# Patient Record
Sex: FEMALE | URBAN - METROPOLITAN AREA
[De-identification: names, ages, dates, MRNs, and addresses within clinical notes are randomized per-mention and may not be internally consistent; named-entity substitution may affect disease eponyms.]

---

## 2019-01-03 RX ORDER — IRBESARTAN AND HYDROCHLOROTHIAZIDE 150; 12.5 MG/1; MG/1
1 TABLET, FILM COATED ORAL DAILY
COMMUNITY

## 2019-01-03 RX ORDER — AMLODIPINE BESYLATE 5 MG/1
5 TABLET ORAL DAILY
COMMUNITY

## 2019-01-03 RX ORDER — ALBUTEROL SULFATE 90 UG/1
2 AEROSOL, METERED RESPIRATORY (INHALATION)
COMMUNITY
End: 2020-07-18 | Stop reason: HOSPADM

## 2019-01-03 RX ORDER — DIVALPROEX SODIUM 250 MG/1
250 TABLET, DELAYED RELEASE ORAL EVERY 12 HOURS SCHEDULED
COMMUNITY
End: 2020-07-18 | Stop reason: HOSPADM

## 2019-01-03 RX ORDER — WATER / MINERAL OIL / WHITE PETROLATUM 16 OZ
1 CREAM TOPICAL 2 TIMES DAILY
COMMUNITY

## 2019-01-03 RX ORDER — DOCUSATE SODIUM 100 MG/1
100 CAPSULE, LIQUID FILLED ORAL 2 TIMES DAILY
COMMUNITY

## 2019-01-03 RX ORDER — ATORVASTATIN CALCIUM 10 MG/1
10 TABLET, FILM COATED ORAL DAILY
COMMUNITY

## 2019-01-03 RX ORDER — LEVOTHYROXINE SODIUM 0.07 MG/1
75 TABLET ORAL DAILY
COMMUNITY

## 2019-01-03 RX ORDER — RISPERIDONE 2 MG/1
3 TABLET, FILM COATED ORAL DAILY
COMMUNITY

## 2019-01-03 NOTE — PRE-PROCEDURE INSTRUCTIONS
My Surgical Experience    The following information was developed to assist you to prepare for your operation  What do I need to do before coming to the hospital?   Arrange for a responsible person to drive you to and from the hospital    Arrange care for your children at home  Children are not allowed in the recovery areas of the hospital   Plan to wear clothing that is easy to put on and take off  If you are having shoulder surgery, wear a shirt that buttons or zippers in the front  Bathing  o Shower the evening before and the morning of your surgery with an antibacterial soap  Please refer to the Pre Op Showering Instructions for Surgery Patients Sheet   o Remove nail polish and all body piercing jewelry  o Do not shave any body part for at least 24 hours before surgery-this includes face, arms, legs and upper body  Food  o Nothing to eat or drink after midnight the night before your surgery  This includes candy and chewing gum  o Exception: If your surgery is after 12:00pm (noon), you may have clear liquids such as 7-Up®, ginger ale, apple or cranberry juice, Jell-O®, water, or clear broth until 8:00 am  o Do not drink milk or juice with pulp on the morning before surgery  o Do not drink alcohol 24 hours before surgery  Medicine  o Follow instructions you received from your surgeon about which medicines you may take on the day of surgery  o If instructed to take medicine on the morning of surgery, take pills with just a small sip of water  Call your prescribing doctor for specific infroamtion on what to do if you take insulin    What should I bring to the hospital?    Bring:  Daniel Ahr or a walker, if you have them, for foot or knee surgery   A list of the daily medicines, vitamins, minerals, herbals and nutritional supplements you take   Include the dosages of medicines and the time you take them each day   Glasses, dentures or hearing aids   Minimal clothing; you will be wearing hospital sleepwear   Photo ID; required to verify your identity   If you have a Living Will or Power of , bring a copy of the documents   If you have an ostomy, bring an extra pouch and any supplies you use    Do not bring   Medicines or inhalers   Money, valuables or jewelry    What other information should I know about the day of surgery?  Notify your surgeons if you develop a cold, sore throat, cough, fever, rash or any other illness   Report to the Ambulatory Surgical/Same Day Surgery Unit   You will be instructed to stop at Registration only if you have not been pre-registered   Inform your  fi they do not stay that they will be asked by the staff to leave a phone number where they can be reached   Be available to be reached before surgery  In the event the operating room schedule changes, you may be asked to come in earlier or later than expected    *It is important to tell your doctor and others involved in your health care if you are taking or have been taking any non-prescription drugs, vitamins, minerals, herbals or other nutritional supplements  Any of these may interact with some food or medicines and cause a reaction      Pre-Surgery Instructions:   Medication Instructions    albuterol (PROVENTIL HFA,VENTOLIN HFA) 90 mcg/act inhaler Instructed patient per Anesthesia Guidelines   amLODIPine (NORVASC) 5 mg tablet Instructed patient per Anesthesia Guidelines   atorvastatin (LIPITOR) 10 mg tablet Instructed patient per Anesthesia Guidelines   divalproex sodium (DEPAKOTE) 250 mg EC tablet Instructed patient per Anesthesia Guidelines   docusate sodium (COLACE) 100 mg capsule Instructed patient per Anesthesia Guidelines   fluticasone-salmeterol (ADVAIR) 500-50 mcg/dose inhaler Instructed patient per Anesthesia Guidelines   irbesartan-hydrochlorothiazide (AVALIDE) 150-12 5 MG per tablet Instructed patient per Anesthesia Guidelines      levothyroxine 75 mcg tablet Instructed patient per Anesthesia Guidelines   risperiDONE (RisperDAL) 2 mg tablet Instructed patient per Anesthesia Guidelines   white petrolatum-mineral oil (EUCERIN) cream Instructed patient per Anesthesia Guidelines  To take amlodipine, depakote, lipitor, avalide, synthroid, risperdal, advair inhaler and maimtain O2 a m  Of surgery

## 2019-01-14 ENCOUNTER — ANESTHESIA EVENT (OUTPATIENT)
Dept: PERIOP | Facility: HOSPITAL | Age: 59
End: 2019-01-14
Payer: COMMERCIAL

## 2019-01-14 NOTE — ANESTHESIA PREPROCEDURE EVALUATION
Review of Systems/Medical History  Patient summary reviewed  Chart reviewed  No history of anesthetic complications     Cardiovascular  Hyperlipidemia, Hypertension ,    Pulmonary  COPD (uses O2 at 1L every 24 hrs) , Oxygen dependent ,        GI/Hepatic            Endo/Other  History of thyroid disease , hypothyroidism,      GYN       Hematology   Musculoskeletal       Neurology   Psychology       Comment: Intellectual disability verbal Bradycardia   Schizo affective schizophrenia                 Physical Exam    Airway    Mallampati score: II  TM Distance: >3 FB  Neck ROM: full     Dental       Cardiovascular  Cardiovascular exam normal    Pulmonary  Pulmonary exam normal     Other Findings        Anesthesia Plan  ASA Score- 3     Anesthesia Type- general with ASA Monitors  Additional Monitors:   Airway Plan: NTT  Plan Factors-    Induction- intravenous  Postoperative Plan- Plan for postoperative opioid use  Informed Consent- Anesthetic plan and risks discussed with legal guardian  I personally reviewed this patient with the CRNA  Discussed and agreed on the Anesthesia Plan with the CRNA  [facility]

## 2019-01-15 ENCOUNTER — ANESTHESIA (OUTPATIENT)
Dept: PERIOP | Facility: HOSPITAL | Age: 59
End: 2019-01-15
Payer: COMMERCIAL

## 2019-01-15 ENCOUNTER — HOSPITAL ENCOUNTER (OUTPATIENT)
Facility: HOSPITAL | Age: 59
Setting detail: OUTPATIENT SURGERY
Discharge: OTHER INSTITUTION | End: 2019-01-15
Attending: DENTIST | Admitting: DENTIST
Payer: COMMERCIAL

## 2019-01-15 ENCOUNTER — HOSPITAL ENCOUNTER (OUTPATIENT)
Dept: RADIOLOGY | Facility: HOSPITAL | Age: 59
Setting detail: OUTPATIENT SURGERY
Discharge: HOME | End: 2019-01-15
Payer: COMMERCIAL

## 2019-01-15 VITALS
WEIGHT: 140 LBS | HEART RATE: 74 BPM | DIASTOLIC BLOOD PRESSURE: 77 MMHG | RESPIRATION RATE: 18 BRPM | TEMPERATURE: 97.2 F | SYSTOLIC BLOOD PRESSURE: 127 MMHG | HEIGHT: 60 IN | OXYGEN SATURATION: 100 % | BODY MASS INDEX: 27.48 KG/M2

## 2019-01-15 RX ORDER — BUPIVACAINE HYDROCHLORIDE AND EPINEPHRINE 5; 5 MG/ML; UG/ML
INJECTION, SOLUTION PERINEURAL AS NEEDED
Status: DISCONTINUED | OUTPATIENT
Start: 2019-01-15 | End: 2019-01-15 | Stop reason: HOSPADM

## 2019-01-15 RX ORDER — DEXAMETHASONE SODIUM PHOSPHATE 4 MG/ML
INJECTION, SOLUTION INTRA-ARTICULAR; INTRALESIONAL; INTRAMUSCULAR; INTRAVENOUS; SOFT TISSUE AS NEEDED
Status: DISCONTINUED | OUTPATIENT
Start: 2019-01-15 | End: 2019-01-15 | Stop reason: SURG

## 2019-01-15 RX ORDER — SODIUM CHLORIDE 9 MG/ML
125 INJECTION, SOLUTION INTRAVENOUS CONTINUOUS
Status: DISCONTINUED | OUTPATIENT
Start: 2019-01-15 | End: 2019-01-15 | Stop reason: HOSPADM

## 2019-01-15 RX ORDER — CLINDAMYCIN PHOSPHATE 600 MG/50ML
600 INJECTION INTRAVENOUS ONCE
Status: COMPLETED | OUTPATIENT
Start: 2019-01-15 | End: 2019-01-15

## 2019-01-15 RX ORDER — ONDANSETRON 2 MG/ML
INJECTION INTRAMUSCULAR; INTRAVENOUS AS NEEDED
Status: DISCONTINUED | OUTPATIENT
Start: 2019-01-15 | End: 2019-01-15 | Stop reason: SURG

## 2019-01-15 RX ORDER — CHLORHEXIDINE GLUCONATE 0.12 MG/ML
RINSE ORAL AS NEEDED
Status: DISCONTINUED | OUTPATIENT
Start: 2019-01-15 | End: 2019-01-15 | Stop reason: HOSPADM

## 2019-01-15 RX ORDER — PROPOFOL 10 MG/ML
INJECTION, EMULSION INTRAVENOUS AS NEEDED
Status: DISCONTINUED | OUTPATIENT
Start: 2019-01-15 | End: 2019-01-15 | Stop reason: SURG

## 2019-01-15 RX ORDER — MAGNESIUM HYDROXIDE 1200 MG/15ML
LIQUID ORAL AS NEEDED
Status: DISCONTINUED | OUTPATIENT
Start: 2019-01-15 | End: 2019-01-15 | Stop reason: HOSPADM

## 2019-01-15 RX ORDER — ROCURONIUM BROMIDE 10 MG/ML
INJECTION, SOLUTION INTRAVENOUS AS NEEDED
Status: DISCONTINUED | OUTPATIENT
Start: 2019-01-15 | End: 2019-01-15 | Stop reason: SURG

## 2019-01-15 RX ADMIN — PROPOFOL 200 MG: 10 INJECTION, EMULSION INTRAVENOUS at 07:43

## 2019-01-15 RX ADMIN — CLINDAMYCIN PHOSPHATE 600 MG: 12 INJECTION, SOLUTION INTRAMUSCULAR; INTRAVENOUS at 07:25

## 2019-01-15 RX ADMIN — PHENYLEPHRINE HYDROCHLORIDE 3 DROP: 1 SPRAY NASAL at 07:43

## 2019-01-15 RX ADMIN — SODIUM CHLORIDE: 0.9 INJECTION, SOLUTION INTRAVENOUS at 10:08

## 2019-01-15 RX ADMIN — ONDANSETRON 4 MG: 2 INJECTION INTRAMUSCULAR; INTRAVENOUS at 07:43

## 2019-01-15 RX ADMIN — ROCURONIUM BROMIDE 35 MG: 10 INJECTION INTRAVENOUS at 07:43

## 2019-01-15 RX ADMIN — SODIUM CHLORIDE 125 ML/HR: 0.9 INJECTION, SOLUTION INTRAVENOUS at 06:42

## 2019-01-15 RX ADMIN — DEXAMETHASONE SODIUM PHOSPHATE 4 MG: 4 INJECTION, SOLUTION INTRA-ARTICULAR; INTRALESIONAL; INTRAMUSCULAR; INTRAVENOUS; SOFT TISSUE at 07:43

## 2019-01-15 NOTE — ANESTHESIA POSTPROCEDURE EVALUATION
Post-Op Assessment Note      CV Status:  Stable    Mental Status:  Alert    Hydration Status:  Stable    PONV Controlled:  Controlled    Airway Patency:  Patent    Post Op Vitals Reviewed: Yes          Staff: CRNA           BP      Temp     Pulse     Resp      SpO2

## 2019-01-15 NOTE — OP NOTE
COMPLETE ORAL REHABILITATION: FULL MOUTH DENTAL X-RAYS, PLANING AND SCALING, COMPOSITE RESTORATIONS: #7-MFL, #5-O, EXTRACTION: #4, AMALGAM RESTORATION: #31-B,  Procedure Note    Britney Flight  1/15/2019    Pre-op Diagnosis:   Chronic periodontitis [K05 30]  Intellectual disability [F79]       Post-Op Diagnosis Codes:     * Chronic periodontitis [K05 30]     * Intellectual disability [F79]     * Situational anxiety [F41 8]     * Dental caries extending into dentin [K02 62]     * Retained dental root [K08 3]    Procedure(s):  COMPLETE ORAL REHABILITATION: FULL MOUTH DENTAL X-RAYS, PLANING AND SCALING, COMPOSITE RESTORATIONS: #7-MFL, #5-O, EXTRACTION: #4, AMALGAM RESTORATION: #31-B,    Surgeon(s) and Role:     * Moragn Morales DMD - Primary     Anesthesia: General    Staff:   Circulator: Damon Ba RN  Scrub Person: Ulysses Fritz  No qualified Resident was available, Resident is only observing    Estimated Blood Loss: Minimal    Specimens:                * No orders in the log *    Drains: None     Procedure:  After the induction of IV inhalation anesthesia with nasal tracheal intubation the patient was prepped and draped for intraoral dental procedure  A time-out identifying the patient and procedure was completed  Thirteen digital dental periapical radiographs were taken and processed  A posterior pharyngeal pack was inserted of cotton gauze with tails  Visual examination of the patient's oral cavity and dentition was accomplished related to the radiographic findings  Plaque and calculus deposits present  Gingival tissues were erythematous and edematous  Dental caries detected teeth numbers 4, 5, 7, 8, 10, 17, and 31  Tooth 4  nonrestorable due to extensive caries of the clinical crown  After the infiltration 0 9 mL of Marcaine 0 5% with 1 to [id number] epinephrine the following dental procedures and restorations were completed    Debridement of the dentition and gingival tissues utilizing ultrasonic scaling and hand instrumentation  A dental prophylaxis was completed  After the removal of dental caries and preparation of tooth number 31 the following amalgam was placed; B tooth number 31  After removal of dental caries and preparation of teeth teeth numbers 5, 7, 8, 10, the following resins were placed; O tooth 5, MFL tooth 7, MDFL tooth 8, MFL tooth 10  After the removal of dental caries and preparation of tooth 17 a GI resin was placed in the body of the tooth  No pulp exposure occurred due to the fact that the pulp canals were calcified  Due to the bulbous nature of the remaining root and the closeness of the mandibular canal, extraction of this tooth should be accomplished by an oral surgeon at a future date  Application of fluoride varnish to all teeth  The oral cavity was then irrigated and suctioned  The oral pharyngeal pack was removed and the oral pharyngeal area was suctioned  Patient left the operating room in stable condition was transported to postanesthesia care unit      Complications:  None      Morenita Lee DMD     Date: 1/15/2019  Time: 10:34 AM

## 2019-01-15 NOTE — INTERVAL H&P NOTE
H&P reviewed  After examining the patient I find no changes in the patients condition since the H&P had been written  Patient finished 7 day course of prednisone yesterday 1/14 which she was prescribed for cough  Attendant and patient both state patient can easily walk several blocks and up flights of stairs without becoming SOB  Lungs CTA b/l this morning   Heart rate and rhythm normal    Karyna Richardson PA-C

## 2019-01-15 NOTE — DISCHARGE SUMMARY
Discharge Summary - Becky Lepe 66 y o  female MRN: [id number]    Unit/Bed#: OR [place] Encounter: [id number]    Admission Date: 1/15/2019     Admitting Diagnosis: Chronic periodontitis [K05 30]  Intellectual disability [F79]      Procedures Performed: No orders of the defined types were placed in this encounter  Complications: none    Discharge Diagnosis: Post-Op Diagnosis Codes:     * Chronic periodontitis [K05 30]     * Intellectual disability [F79]     * Situational anxiety [F41 8]     * Dental caries extending into dentin [K02 62]     * Retained dental root [K08 3]    Condition at Discharge: good     Discharge instructions/Information to patient and family:   See after visit summary for information provided to patient and family  Provisions for Follow-Up Care:  See after visit summary for information related to follow-up care and any pertinent home health orders  Disposition: See After Visit Summary for discharge disposition information  Discharge Statement   I spent on the day of discharge  I had direct contact with the patient on the day of discharge  Additional documentation is required if more than 30 minutes were spent on discharge  Discharge Medications:  See after visit summary for reconciled discharge medications provided to patient and family

## 2019-01-15 NOTE — DISCHARGE INSTRUCTIONS
DISCHARGE INSTRUCTIONS  DENTAL PROCEDURE      1  Saline rinses 4x per day for 10 days    2  Do not use drinking straws if you have had dental extractions    3  Soft diet for 24 hours    4  May return to previous work and activity in  24-48 hours    5  Return to office for follow-up on 7-10 days      [facility]      1  Saline rinses 4x per day for 10 days    2  Do not use drinking straws if you have had dental extractions    3  Soft diet for 24 hours    4  May return to previous work and activity in  24-48 hours    5    Return to office for follow-up on 7-10 days

## 2019-01-15 NOTE — OP NOTE
OPERATIVE REPORT  PATIENT NAME: Andrew Tenorio    :  1960  MRN: [id number]  Pt Location: [facility] OR ROOM 02    SURGERY DATE: 1/15/2019    Surgeon(s) and Role:     * Rubi Bruner DMD - Primary    Preop Diagnosis:  Chronic periodontitis [K05 30]  Intellectual disability [F79]    Post-Op Diagnosis Codes:     * Chronic periodontitis [K05 30]     * Intellectual disability [F79]     * Situational anxiety [F41 8]     * Dental caries extending into dentin [K02 62]     * Retained dental root [K08 3]    Procedure(s) (LRB):  COMPLETE ORAL REHABILITATION: FULL MOUTH DENTAL X-RAYS, PLANING AND SCALING, COMPOSITE RESTORATIONS: #7-MFL, #5-O, EXTRACTION: #4, AMALGAM RESTORATION: #31-B, (N/A)    Specimen(s):  * No specimens in log *    Estimated Blood Loss:   Minimal    Drains:       Anesthesia Type:   General    Operative Indications:  Chronic periodontitis [K05 30]  Intellectual disability [F79]    Drains: None    Complications: None    Procedure After the induction of IV inhalation anesthesia with nasal tracheal intubation the patient was prepped and draped for intraoral dental procedure  A time-out identifying the patient and procedure was completed  Thirteen digital dental periapical radiographs were taken and processed  A posterior pharyngeal pack was inserted of cotton gauze with tails  Visual examination of the patient's oral cavity and dentition was accomplished related to the radiographic findings  Plaque and calculus deposits present  Gingival tissues were erythematous and edematous  Dental caries detected teeth numbers 4, 5, 7, 8, 10, 17, and 31  Tooth 4  nonrestorable due to extensive caries of the clinical crown  After the infiltration 0 9 mL of Marcaine 0 5% with 1 to [id number] epinephrine the following dental procedures and restorations were completed  Debridement of the dentition and gingival tissues utilizing ultrasonic scaling and hand instrumentation  A dental prophylaxis was completed    After the removal of dental caries and preparation of tooth number 31 the following amalgam was placed; B tooth number 31  After removal of dental caries and preparation of teeth teeth numbers 5, 7, 8, 10, the following resins were placed; O tooth 5, MFL tooth 7, MDFL tooth 8, MFL tooth 10  After the removal of dental caries and preparation of tooth 17 a GI resin was placed in the body of the tooth  No pulp exposure occurred due to the fact that the pulp canals were calcified  Due to the bulbous nature of the remaining root and the closeness of the mandibular canal, extraction of this tooth should be accomplished by an oral surgeon at a future date  A surgical extraction of tooth 4 was accomplished by using a 15 blade to establish a full-thickness flap buccal to tooth 4  Surgical handpiece with a surgical length 56 bur was employed to remove buccal and mesial alveolar bone  The root was then elevated and removed with forceps  Socket site was curetted, irrigated and suctioned  Gel-Foam was placed at the extraction site  The gingival tissue was repositioned and 3-0 Vicryl sutures were placed at the extraction site  Application of fluoride varnish to all teeth  The oral cavity was then irrigated and suctioned  The oral pharyngeal pack was removed and the oral pharyngeal area was suctioned  Patient left the operating room in stable condition was transported to postanesthesia care unit        SIGNATURE: Lilliana Mariee DMD  DATE: January 15, 2019  TIME: 12:28 PM

## 2019-01-15 NOTE — PERIOPERATIVE NURSING NOTE
Patient tolerating apple juice, no oral drainage seen  Attendant at bedside, discharge instructions given to attendant with forms for group home

## 2020-07-07 ENCOUNTER — OFFICE VISIT (OUTPATIENT)
Dept: URGENT CARE | Facility: CLINIC | Age: 60
End: 2020-07-07
Payer: MEDICARE

## 2020-07-07 DIAGNOSIS — Z11.59 SCREENING FOR VIRAL DISEASE: Primary | ICD-10-CM

## 2020-07-07 PROCEDURE — 99202 OFFICE O/P NEW SF 15 MIN: CPT | Performed by: PHYSICIAN ASSISTANT

## 2020-07-07 PROCEDURE — U0003 INFECTIOUS AGENT DETECTION BY NUCLEIC ACID (DNA OR RNA); SEVERE ACUTE RESPIRATORY SYNDROME CORONAVIRUS 2 (SARS-COV-2) (CORONAVIRUS DISEASE [COVID-19]), AMPLIFIED PROBE TECHNIQUE, MAKING USE OF HIGH THROUGHPUT TECHNOLOGIES AS DESCRIBED BY CMS-2020-01-R: HCPCS | Performed by: PHYSICIAN ASSISTANT

## 2020-07-07 NOTE — PROGRESS NOTES
[facility]        NAME: Estefani Duffy is a 64 y o  female  : 1960    MRN: [id number]  DATE:  2020  TIME: 5:53 PM    Assessment and Plan   Screening for viral disease [Z11 59]  1  Screening for viral disease  Novel Coronavirus (COVID-19), PCR [facility]    INPATIENT (COVID-19 HIGH PRIORITY)    Novel Coronavirus (COVID-19), PCR [facility]    INPATIENT (COVID-19 HIGH PRIORITY)    CANCELED: PAT Covid Screening         Patient Instructions     Patient will be swabbed for COVID-19 today in advance for planned procedure  She will be contacted with results once obtained  Chief Complaint     Chief Complaint   Patient presents with    COVID-19     Pre-op Eval         History of Present Illness       Patient presents requesting [id number] testing in advance of surgical procedure for PAT screening  She is asymptomatic and has no complaints at today's visit  Review of Systems   Review of Systems   Constitutional: Negative  Respiratory: Negative  Cardiovascular: Negative  Gastrointestinal: Negative  Genitourinary: Negative            Current Medications       Current Outpatient Medications:     albuterol (2 5 mg/3 mL) 0 083 % nebulizer solution, 3 times a day and every 4 hours p r n , Disp: , Rfl: 0    amLODIPine (NORVASC) 5 mg tablet, Take 5 mg by mouth daily, Disp: , Rfl:     atorvastatin (LIPITOR) 10 mg tablet, Take 10 mg by mouth daily, Disp: , Rfl:     budesonide (PULMICORT) 0 5 mg/2 mL nebulizer solution, Take 0 5 mg by nebulization daily Rinse mouth after use  , Disp: , Rfl:     dextromethorphan 15 MG/5ML syrup, Take 10 mL by mouth 4 (four) times a day as needed for cough, Disp: , Rfl:     divalproex sodium (DEPAKOTE) 250 mg EC tablet, Take 1 tablet (250 mg total) by mouth daily, Disp: , Rfl: 0    divalproex sodium (DEPAKOTE) 250 mg EC tablet, Take 2 tablets (500 mg total) by mouth daily at bedtime, Disp: , Rfl: 0    docusate sodium (COLACE) 100 mg capsule, Take 100 mg by mouth 2 (two) times a day, Disp: , Rfl:     fluticasone-salmeterol (ADVAIR) 500-50 mcg/dose inhaler, Inhale 1 puff 2 (two) times a day Rinse mouth after use , Disp: , Rfl:     hydrocodone-chlorpheniramine polistirex (TUSSIONEX) 10-8 mg/5 mL ER suspension, Take 5 mL by mouth every 12 (twelve) hours as needed for cough for up to 7 daysMax Daily Amount: 10 mL, Disp: 70 mL, Rfl: 0    ibuprofen (MOTRIN) 200 mg tablet, Take by mouth every 6 (six) hours as needed for mild pain, Disp: , Rfl:     irbesartan-hydrochlorothiazide (AVALIDE) 150-12 5 MG per tablet, Take 1 tablet by mouth daily, Disp: , Rfl:     levothyroxine 75 mcg tablet, Take 75 mcg by mouth daily, Disp: , Rfl:     magnesium hydroxide (MILK OF MAGNESIA) 400 mg/5 mL oral suspension, Take by mouth daily as needed for constipation, Disp: , Rfl:     pseudoephedrine (SUDAFED) 30 mg tablet, Take 60 mg by mouth every 4 (four) hours as needed for congestion, Disp: , Rfl:     risperiDONE (RisperDAL) 2 mg tablet, Take 3 mg by mouth daily , Disp: , Rfl:     risperiDONE (RisperDAL) 2 mg tablet, Take 2 mg by mouth daily at bedtime, Disp: , Rfl:     umeclidinium bromide (Incruse Ellipta) 62 5 mcg/inh AEPB inhaler, Inhale 1 puff daily, Disp: , Rfl:     white petrolatum-mineral oil (EUCERIN) cream, Apply 1 application topically 2 (two) times a day To both hands  , Disp: , Rfl:     Current Allergies     Allergies as of 07/07/2020    (No Known Allergies)            The following portions of the patient's history were reviewed and updated as appropriate: allergies, current medications, past family history, past medical history, past social history, past surgical history and problem list      Past Medical History:   Diagnosis Date    Bradycardia     COPD (chronic obstructive pulmonary disease) ([facility] [address] )     uses O2 at 1L every 24 hrs      Disease of thyroid gland     hypo    Hyperlipidemia     Hypertension     Intellectual disability     verbal    Schizo affective schizophrenia [facility])        Past Surgical History:   Procedure Laterality Date    INSERT / REPLACE / REMOVE PACEMAKER  2009    [place] DENTAL SURGERY PROCEDURE N/A 1/15/2019    Procedure: COMPLETE ORAL REHABILITATION: FULL MOUTH DENTAL X-RAYS, PLANING AND SCALING, COMPOSITE RESTORATIONS: #7-MFL, #5-O, EXTRACTION: #4, AMALGAM RESTORATION: #31-B,;  Surgeon: Luan Cabrera DMD;  Location: [address] [place] [address];  Service: Oral Surgery       History reviewed  No pertinent family history  Medications have been verified  Objective   Pulse 64   Temp 97 9 °F (36 6 °C)   SpO2 (!) 88%        Physical Exam     Physical Exam   Constitutional: She is oriented to person, place, and time  She appears well-developed and well-nourished  No distress  Cardiovascular: Normal rate, regular rhythm and normal heart sounds  No murmur heard  Pulmonary/Chest: Effort normal and breath sounds normal  No respiratory distress  Neurological: She is alert and oriented to person, place, and time  Psychiatric: She has a normal mood and affect  Vitals reviewed

## 2020-07-09 ENCOUNTER — TELEPHONE (OUTPATIENT)
Dept: URGENT CARE | Facility: CLINIC | Age: 60
End: 2020-07-09

## 2020-07-09 LAB
INPATIENT: NORMAL
SARS-COV-2 RNA SPEC QL NAA+PROBE: NOT DETECTED

## 2020-07-09 NOTE — TELEPHONE ENCOUNTER
Called for COVID results  Negative  Haylee Ledbetter our Tech will fax results to the dentist office, release is on file

## 2020-07-10 ENCOUNTER — APPOINTMENT (EMERGENCY)
Dept: RADIOLOGY | Facility: HOSPITAL | Age: 60
DRG: 207 | End: 2020-07-10
Payer: MEDICARE

## 2020-07-10 ENCOUNTER — HOSPITAL ENCOUNTER (INPATIENT)
Facility: HOSPITAL | Age: 60
LOS: 8 days | Discharge: NURSING, REHAB OR LONG-TERM CARE | DRG: 207 | End: 2020-07-18
Attending: EMERGENCY MEDICINE | Admitting: INTERNAL MEDICINE
Payer: MEDICARE

## 2020-07-10 ENCOUNTER — APPOINTMENT (INPATIENT)
Dept: RADIOLOGY | Facility: HOSPITAL | Age: 60
DRG: 207 | End: 2020-07-10
Payer: MEDICARE

## 2020-07-10 DIAGNOSIS — J69.0 ASPIRATION PNEUMONIA (HCC): ICD-10-CM

## 2020-07-10 DIAGNOSIS — F25.9 SCHIZO AFFECTIVE SCHIZOPHRENIA (HCC): Chronic | ICD-10-CM

## 2020-07-10 DIAGNOSIS — J96.00 ACUTE RESPIRATORY FAILURE REQUIRING REINTUBATION (HCC): Primary | ICD-10-CM

## 2020-07-10 PROBLEM — E78.5 HYPERLIPIDEMIA: Status: ACTIVE | Noted: 2020-07-10

## 2020-07-10 PROBLEM — F79 INTELLECTUAL DISABILITY: Status: ACTIVE | Noted: 2020-07-10

## 2020-07-10 PROBLEM — I10 HYPERTENSION: Status: ACTIVE | Noted: 2020-07-10

## 2020-07-10 PROBLEM — J44.9 COPD (CHRONIC OBSTRUCTIVE PULMONARY DISEASE) (HCC): Status: ACTIVE | Noted: 2020-07-10

## 2020-07-10 PROBLEM — E07.9 DISEASE OF THYROID GLAND: Status: ACTIVE | Noted: 2020-07-10

## 2020-07-10 PROBLEM — J96.02 ACUTE RESPIRATORY FAILURE WITH HYPERCAPNIA (HCC): Status: ACTIVE | Noted: 2020-07-10

## 2020-07-10 PROBLEM — J45.901 ASTHMA EXACERBATION: Status: ACTIVE | Noted: 2019-03-04

## 2020-07-10 LAB
ALBUMIN SERPL BCP-MCNC: 2.6 G/DL (ref 3.5–5)
ALP SERPL-CCNC: 47 U/L (ref 46–116)
ALT SERPL W P-5'-P-CCNC: 17 U/L (ref 12–78)
ANION GAP SERPL CALCULATED.3IONS-SCNC: 6 MMOL/L (ref 4–13)
ARTERIAL PATENCY WRIST A: YES
AST SERPL W P-5'-P-CCNC: 25 U/L (ref 5–45)
BASE EXCESS BLDA CALC-SCNC: 4.6 MMOL/L
BASE EXCESS BLDA CALC-SCNC: 5.1 MMOL/L
BASOPHILS # BLD AUTO: 0.01 THOUSANDS/ΜL (ref 0–0.1)
BASOPHILS NFR BLD AUTO: 0 % (ref 0–1)
BILIRUB SERPL-MCNC: 0.3 MG/DL (ref 0.2–1)
BODY TEMPERATURE: 96.2 DEGREES FEHRENHEIT
BODY TEMPERATURE: 96.4 DEGREES FEHRENHEIT
BUN SERPL-MCNC: 14 MG/DL (ref 5–25)
CALCIUM SERPL-MCNC: 8.4 MG/DL (ref 8.3–10.1)
CHLORIDE SERPL-SCNC: 97 MMOL/L (ref 100–108)
CO2 SERPL-SCNC: 30 MMOL/L (ref 21–32)
CREAT SERPL-MCNC: 0.96 MG/DL (ref 0.6–1.3)
EOSINOPHIL # BLD AUTO: 0.06 THOUSAND/ΜL (ref 0–0.61)
EOSINOPHIL NFR BLD AUTO: 1 % (ref 0–6)
ERYTHROCYTE [DISTWIDTH] IN BLOOD BY AUTOMATED COUNT: 12 % (ref 11.6–15.1)
GFR SERPL CREATININE-BSD FRML MDRD: 65 ML/MIN/1.73SQ M
GLUCOSE SERPL-MCNC: 122 MG/DL (ref 65–140)
HCO3 BLDA-SCNC: 31.4 MMOL/L (ref 22–28)
HCO3 BLDA-SCNC: 31.7 MMOL/L (ref 22–28)
HCT VFR BLD AUTO: 30.6 % (ref 34.8–46.1)
HGB BLD-MCNC: 9.9 G/DL (ref 11.5–15.4)
HOROWITZ INDEX BLDA+IHG-RTO: 50 MM[HG]
HOROWITZ INDEX BLDA+IHG-RTO: 80 MM[HG]
IMM GRANULOCYTES # BLD AUTO: 0.01 THOUSAND/UL (ref 0–0.2)
IMM GRANULOCYTES NFR BLD AUTO: 0 % (ref 0–2)
LYMPHOCYTES # BLD AUTO: 0.48 THOUSANDS/ΜL (ref 0.6–4.47)
LYMPHOCYTES NFR BLD AUTO: 11 % (ref 14–44)
MAGNESIUM SERPL-MCNC: 1.6 MG/DL (ref 1.6–2.6)
MCH RBC QN AUTO: 31.3 PG (ref 26.8–34.3)
MCHC RBC AUTO-ENTMCNC: 32.4 G/DL (ref 31.4–37.4)
MCV RBC AUTO: 97 FL (ref 82–98)
MONOCYTES # BLD AUTO: 0.1 THOUSAND/ΜL (ref 0.17–1.22)
MONOCYTES NFR BLD AUTO: 2 % (ref 4–12)
NEUTROPHILS # BLD AUTO: 3.89 THOUSANDS/ΜL (ref 1.85–7.62)
NEUTS SEG NFR BLD AUTO: 86 % (ref 43–75)
NRBC BLD AUTO-RTO: 0 /100 WBCS
O2 CT BLDA-SCNC: 16 ML/DL (ref 16–23)
O2 CT BLDA-SCNC: 16.7 ML/DL (ref 16–23)
OXYHGB MFR BLDA: 99 % (ref 94–97)
OXYHGB MFR BLDA: 99.3 % (ref 94–97)
PCO2 BLDA: 54 MM HG (ref 36–44)
PCO2 BLDA: 60.4 MM HG (ref 36–44)
PCO2 TEMP ADJ BLDA: 51.2 MM HG (ref 36–44)
PCO2 TEMP ADJ BLDA: 57.1 MM HG (ref 36–44)
PEEP RESPIRATORY: 5 CM[H2O]
PEEP RESPIRATORY: 5 CM[H2O]
PH BLD: 7.36 [PH] (ref 7.35–7.45)
PH BLD: 7.4 [PH] (ref 7.35–7.45)
PH BLDA: 7.34 [PH] (ref 7.35–7.45)
PH BLDA: 7.38 [PH] (ref 7.35–7.45)
PLATELET # BLD AUTO: 170 THOUSANDS/UL (ref 149–390)
PMV BLD AUTO: 10.8 FL (ref 8.9–12.7)
PO2 BLD: 276.7 MM HG (ref 75–129)
PO2 BLD: 286.2 MM HG (ref 75–129)
PO2 BLDA: 282.4 MM HG (ref 75–129)
PO2 BLDA: 292.4 MM HG (ref 75–129)
POTASSIUM SERPL-SCNC: 4.3 MMOL/L (ref 3.5–5.3)
PROT SERPL-MCNC: 6.8 G/DL (ref 6.4–8.2)
RBC # BLD AUTO: 3.16 MILLION/UL (ref 3.81–5.12)
SODIUM SERPL-SCNC: 133 MMOL/L (ref 136–145)
SPECIMEN SOURCE: ABNORMAL
SPECIMEN SOURCE: ABNORMAL
VENT AC: 12
VENT AC: 16
VENT- AC: AC
VENT- AC: AC
VT SETTING VENT: 325 ML
VT SETTING VENT: 350 ML
WBC # BLD AUTO: 4.55 THOUSAND/UL (ref 4.31–10.16)

## 2020-07-10 PROCEDURE — 5A1955Z RESPIRATORY VENTILATION, GREATER THAN 96 CONSECUTIVE HOURS: ICD-10-PCS | Performed by: EMERGENCY MEDICINE

## 2020-07-10 PROCEDURE — 85025 COMPLETE CBC W/AUTO DIFF WBC: CPT | Performed by: EMERGENCY MEDICINE

## 2020-07-10 PROCEDURE — 99285 EMERGENCY DEPT VISIT HI MDM: CPT

## 2020-07-10 PROCEDURE — 94760 N-INVAS EAR/PLS OXIMETRY 1: CPT

## 2020-07-10 PROCEDURE — 99283 EMERGENCY DEPT VISIT LOW MDM: CPT | Performed by: EMERGENCY MEDICINE

## 2020-07-10 PROCEDURE — 94640 AIRWAY INHALATION TREATMENT: CPT

## 2020-07-10 PROCEDURE — 99223 1ST HOSP IP/OBS HIGH 75: CPT | Performed by: INTERNAL MEDICINE

## 2020-07-10 PROCEDURE — 36415 COLL VENOUS BLD VENIPUNCTURE: CPT | Performed by: EMERGENCY MEDICINE

## 2020-07-10 PROCEDURE — 82805 BLOOD GASES W/O2 SATURATION: CPT | Performed by: NURSE PRACTITIONER

## 2020-07-10 PROCEDURE — 87205 SMEAR GRAM STAIN: CPT | Performed by: STUDENT IN AN ORGANIZED HEALTH CARE EDUCATION/TRAINING PROGRAM

## 2020-07-10 PROCEDURE — 82805 BLOOD GASES W/O2 SATURATION: CPT | Performed by: EMERGENCY MEDICINE

## 2020-07-10 PROCEDURE — 94002 VENT MGMT INPAT INIT DAY: CPT

## 2020-07-10 PROCEDURE — 80053 COMPREHEN METABOLIC PANEL: CPT | Performed by: EMERGENCY MEDICINE

## 2020-07-10 PROCEDURE — 36600 WITHDRAWAL OF ARTERIAL BLOOD: CPT

## 2020-07-10 PROCEDURE — 87081 CULTURE SCREEN ONLY: CPT | Performed by: INTERNAL MEDICINE

## 2020-07-10 PROCEDURE — 71045 X-RAY EXAM CHEST 1 VIEW: CPT

## 2020-07-10 PROCEDURE — 94150 VITAL CAPACITY TEST: CPT

## 2020-07-10 PROCEDURE — 71250 CT THORAX DX C-: CPT

## 2020-07-10 PROCEDURE — 87070 CULTURE OTHR SPECIMN AEROBIC: CPT | Performed by: STUDENT IN AN ORGANIZED HEALTH CARE EDUCATION/TRAINING PROGRAM

## 2020-07-10 PROCEDURE — 83735 ASSAY OF MAGNESIUM: CPT | Performed by: EMERGENCY MEDICINE

## 2020-07-10 PROCEDURE — NC001 PR NO CHARGE: Performed by: NURSE PRACTITIONER

## 2020-07-10 RX ORDER — BUDESONIDE 0.5 MG/2ML
0.5 INHALANT ORAL DAILY
COMMUNITY

## 2020-07-10 RX ORDER — DEXMEDETOMIDINE HYDROCHLORIDE 4 UG/ML
.1-.7 INJECTION, SOLUTION INTRAVENOUS
Status: DISCONTINUED | OUTPATIENT
Start: 2020-07-10 | End: 2020-07-11

## 2020-07-10 RX ORDER — ACETAMINOPHEN 500 MG
500 TABLET ORAL EVERY 6 HOURS PRN
COMMUNITY
End: 2020-07-18 | Stop reason: HOSPADM

## 2020-07-10 RX ORDER — PROPOFOL 10 MG/ML
50 INJECTION, EMULSION INTRAVENOUS ONCE
Status: COMPLETED | OUTPATIENT
Start: 2020-07-10 | End: 2020-07-10

## 2020-07-10 RX ORDER — FENTANYL CITRATE 50 UG/ML
25 INJECTION, SOLUTION INTRAMUSCULAR; INTRAVENOUS
Status: DISCONTINUED | OUTPATIENT
Start: 2020-07-10 | End: 2020-07-11

## 2020-07-10 RX ORDER — ACETAMINOPHEN 160 MG/5ML
650 SUSPENSION, ORAL (FINAL DOSE FORM) ORAL EVERY 6 HOURS PRN
Status: DISCONTINUED | OUTPATIENT
Start: 2020-07-10 | End: 2020-07-10

## 2020-07-10 RX ORDER — AMLODIPINE BESYLATE 5 MG/1
5 TABLET ORAL DAILY
Status: DISCONTINUED | OUTPATIENT
Start: 2020-07-11 | End: 2020-07-11

## 2020-07-10 RX ORDER — AMOXICILLIN 250 MG
1 CAPSULE ORAL 2 TIMES DAILY
Status: DISCONTINUED | OUTPATIENT
Start: 2020-07-10 | End: 2020-07-10 | Stop reason: CLARIF

## 2020-07-10 RX ORDER — VALPROIC ACID 250 MG/5ML
250 SOLUTION ORAL EVERY 12 HOURS SCHEDULED
Status: DISCONTINUED | OUTPATIENT
Start: 2020-07-10 | End: 2020-07-18 | Stop reason: HOSPADM

## 2020-07-10 RX ORDER — LEVOTHYROXINE SODIUM 0.07 MG/1
75 TABLET ORAL
Status: DISCONTINUED | OUTPATIENT
Start: 2020-07-11 | End: 2020-07-18 | Stop reason: HOSPADM

## 2020-07-10 RX ORDER — IBUPROFEN 200 MG
TABLET ORAL EVERY 6 HOURS PRN
COMMUNITY

## 2020-07-10 RX ORDER — RISPERIDONE 1 MG/1
2 TABLET, FILM COATED ORAL 2 TIMES DAILY
Status: DISCONTINUED | OUTPATIENT
Start: 2020-07-10 | End: 2020-07-18 | Stop reason: HOSPADM

## 2020-07-10 RX ORDER — MAGNESIUM SULFATE HEPTAHYDRATE 40 MG/ML
2 INJECTION, SOLUTION INTRAVENOUS ONCE
Status: COMPLETED | OUTPATIENT
Start: 2020-07-10 | End: 2020-07-10

## 2020-07-10 RX ORDER — PROMETHAZINE HYDROCHLORIDE AND CODEINE PHOSPHATE 6.25; 1 MG/5ML; MG/5ML
SOLUTION ORAL
Status: ON HOLD | COMMUNITY
End: 2020-07-18 | Stop reason: ALTCHOICE

## 2020-07-10 RX ORDER — CHLORHEXIDINE GLUCONATE 0.12 MG/ML
15 RINSE ORAL EVERY 12 HOURS SCHEDULED
Status: DISCONTINUED | OUTPATIENT
Start: 2020-07-10 | End: 2020-07-15

## 2020-07-10 RX ORDER — BUDESONIDE 0.5 MG/2ML
0.5 INHALANT ORAL
Status: DISCONTINUED | OUTPATIENT
Start: 2020-07-10 | End: 2020-07-12

## 2020-07-10 RX ORDER — ACETAMINOPHEN 325 MG/1
650 TABLET ORAL EVERY 6 HOURS PRN
Status: DISCONTINUED | OUTPATIENT
Start: 2020-07-10 | End: 2020-07-18 | Stop reason: HOSPADM

## 2020-07-10 RX ORDER — ATORVASTATIN CALCIUM 10 MG/1
10 TABLET, FILM COATED ORAL
Status: DISCONTINUED | OUTPATIENT
Start: 2020-07-10 | End: 2020-07-18 | Stop reason: HOSPADM

## 2020-07-10 RX ORDER — DIVALPROEX SODIUM 250 MG/1
250 TABLET, DELAYED RELEASE ORAL EVERY 12 HOURS SCHEDULED
Status: DISCONTINUED | OUTPATIENT
Start: 2020-07-10 | End: 2020-07-10

## 2020-07-10 RX ORDER — PSEUDOEPHEDRINE HYDROCHLORIDE 30 MG/1
60 TABLET ORAL EVERY 4 HOURS PRN
COMMUNITY

## 2020-07-10 RX ORDER — SODIUM CHLORIDE, SODIUM GLUCONATE, SODIUM ACETATE, POTASSIUM CHLORIDE, MAGNESIUM CHLORIDE, SODIUM PHOSPHATE, DIBASIC, AND POTASSIUM PHOSPHATE .53; .5; .37; .037; .03; .012; .00082 G/100ML; G/100ML; G/100ML; G/100ML; G/100ML; G/100ML; G/100ML
75 INJECTION, SOLUTION INTRAVENOUS CONTINUOUS
Status: DISCONTINUED | OUTPATIENT
Start: 2020-07-10 | End: 2020-07-10

## 2020-07-10 RX ORDER — PROPOFOL 10 MG/ML
5-50 INJECTION, EMULSION INTRAVENOUS
Status: DISCONTINUED | OUTPATIENT
Start: 2020-07-10 | End: 2020-07-10

## 2020-07-10 RX ORDER — GUAIFENESIN AND CODEINE PHOSPHATE 100; 10 MG/5ML; MG/5ML
5 SOLUTION ORAL 3 TIMES DAILY PRN
Status: ON HOLD | COMMUNITY
End: 2020-07-18 | Stop reason: ALTCHOICE

## 2020-07-10 RX ORDER — LOPERAMIDE HYDROCHLORIDE 2 MG/1
2 CAPSULE ORAL 4 TIMES DAILY PRN
COMMUNITY
End: 2020-07-18 | Stop reason: HOSPADM

## 2020-07-10 RX ORDER — PROPOFOL 10 MG/ML
INJECTION, EMULSION INTRAVENOUS
Status: COMPLETED
Start: 2020-07-10 | End: 2020-07-10

## 2020-07-10 RX ORDER — ALBUTEROL SULFATE 2.5 MG/3ML
2.5 SOLUTION RESPIRATORY (INHALATION)
Status: DISCONTINUED | OUTPATIENT
Start: 2020-07-10 | End: 2020-07-11

## 2020-07-10 RX ADMIN — PROPOFOL 50 MG: 10 INJECTION, EMULSION INTRAVENOUS at 11:20

## 2020-07-10 RX ADMIN — SODIUM CHLORIDE, SODIUM GLUCONATE, SODIUM ACETATE, POTASSIUM CHLORIDE, MAGNESIUM CHLORIDE, SODIUM PHOSPHATE, DIBASIC, AND POTASSIUM PHOSPHATE 75 ML/HR: .53; .5; .37; .037; .03; .012; .00082 INJECTION, SOLUTION INTRAVENOUS at 14:34

## 2020-07-10 RX ADMIN — ATORVASTATIN CALCIUM 10 MG: 10 TABLET, FILM COATED ORAL at 16:49

## 2020-07-10 RX ADMIN — MAGNESIUM SULFATE HEPTAHYDRATE 2 G: 40 INJECTION, SOLUTION INTRAVENOUS at 14:53

## 2020-07-10 RX ADMIN — BUDESONIDE 0.5 MG: 0.5 INHALANT RESPIRATORY (INHALATION) at 19:54

## 2020-07-10 RX ADMIN — PROPOFOL 10 MCG/KG/MIN: 10 INJECTION, EMULSION INTRAVENOUS at 12:15

## 2020-07-10 RX ADMIN — DEXMEDETOMIDINE HYDROCHLORIDE 0.7 MCG/KG/HR: 4 INJECTION, SOLUTION INTRAVENOUS at 20:23

## 2020-07-10 RX ADMIN — PROPOFOL 25 MCG/KG/MIN: 10 INJECTION, EMULSION INTRAVENOUS at 18:45

## 2020-07-10 RX ADMIN — PROPOFOL 50 MG: 10 INJECTION, EMULSION INTRAVENOUS at 12:15

## 2020-07-10 RX ADMIN — PIPERACILLIN SODIUM AND TAZOBACTAM SODIUM 3.38 G: 36; 4.5 INJECTION, POWDER, FOR SOLUTION INTRAVENOUS at 22:05

## 2020-07-10 RX ADMIN — CHLORHEXIDINE GLUCONATE 0.12% ORAL RINSE 15 ML: 1.2 LIQUID ORAL at 20:30

## 2020-07-10 RX ADMIN — ALBUTEROL SULFATE 2.5 MG: 2.5 SOLUTION RESPIRATORY (INHALATION) at 19:53

## 2020-07-10 RX ADMIN — FENTANYL CITRATE 25 MCG: 50 INJECTION INTRAMUSCULAR; INTRAVENOUS at 20:15

## 2020-07-10 RX ADMIN — RISPERIDONE 2 MG: 1 TABLET ORAL at 20:26

## 2020-07-10 RX ADMIN — SENNOSIDES 8.8 MG: 8.8 SYRUP ORAL at 17:15

## 2020-07-10 RX ADMIN — PIPERACILLIN SODIUM AND TAZOBACTAM SODIUM 3.38 G: 36; 4.5 INJECTION, POWDER, FOR SOLUTION INTRAVENOUS at 16:49

## 2020-07-10 RX ADMIN — VALPROIC ACID 250 MG: 250 SOLUTION ORAL at 20:30

## 2020-07-10 NOTE — H&P
Progress Note - Shandra Medrano 1960, 64 y o  female MRN: [id number]    Unit/Bed#: ICU 07 Encounter: [id number]    Primary Care Provider: No primary care provider on file  Date and time admitted to hospital: 7/10/2020 11:18 AM        * Acute respiratory failure with hypercapnia (HCC)  Assessment & Plan  Likely secondary to pulmonary fibrosis versus aspiration pneumonia versus overlap  Patient arrived intubated from her dental surgery after failure and extubation  Patient is known to be on 1 L O2 at baseline  Patient has pulmonary fibrosis with suspected right middle lobe aspiration pneumonia on CT scan  ABG on arrival shows respiratory acidemia with pH of 7 338 and pCO2 of 64      - repeat ABG is improved  -Continue with propofol sedation  -Continue with current vent settings AC/VC 16/325/50/5  - continue with Zosyn  - wean as tolerated  - continue with home Pulmicort    Aspiration pneumonia (HCC)  Assessment & Plan  Likely from the extubation process after the dental procedure  -continue with Zosyn  - WBC normal on admission however will need to be monitored closely  - sputum culture pending    COPD (chronic obstructive pulmonary disease) ([facility] [address] )  Assessment & Plan  Patient said to be on 1 L oxygen at baseline    Continue with home Pulmicort    Disease of thyroid gland  Assessment & Plan  - continue with home levothyroxine 75 mcg    Hypertension  Assessment & Plan  Patient's BP stable on arrival  Continue with home medication Norvasc 5 mg    Hyperlipidemia  Assessment & Plan  Continue home Lipitor 10 mg per NG tube    Schizo affective schizophrenia ([facility] [address] )  Assessment & Plan  Continue with risperidone    Intellectual disability  Assessment & Plan  Chronic; nature of the disability not clear        -------------------------------------------------------------------------------------------------------------  Chief Complaint:  Postop complication    History of Present Illness   HX and PE limited by: Intubation  Kalpesh Varma is a 64 y o  female with intellectual disability COPD, hyperlipidemia , hypertension, thyroid disease , schizoaffective schizophrenia who presents with respiratory distressed after dental procedure  Patient was intubated for the dental procedure today however failed extubation and was immediately reintubated  Patient lives at group home and is unable to give any history due to intubation and sedation  History obtained from group home staff   -------------------------------------------------------------------------------------------------------------  Dispo: Admit to Critical Care      Code Status: Level 1 - Full Code  --------------------------------------------------------------------------------------------------------------  Review of Systems   Unable to perform ROS: Intubated       Physical Exam   Constitutional: She is oriented to person, place, and time  No distress  HENT:   Head: Normocephalic and atraumatic  Cardiovascular: Normal rate, regular rhythm and intact distal pulses  Pulmonary/Chest: Breath sounds normal  No stridor  She has no wheezes  She has no rales  Intubated   Neurological: She is alert and oriented to person, place, and time  Skin: Skin is warm and dry  She is not diaphoretic  Nursing note and vitals reviewed  --------------------------------------------------------------------------------------------------------------  Vitals:   Vitals:    07/10/20 1315 07/10/20 1410 07/10/20 1601 07/10/20 1645   BP: 130/67 156/74     BP Location: Right arm Right arm     Pulse: 64 72     Resp: 16 18     Temp:  (!) 96 4 °F (35 8 °C)  (!) 95 9 °F (35 5 °C)   TempSrc:  Temporal  Temporal   SpO2: 100% 100% 100%    Weight:  62 6 kg (138 lb 0 1 oz)     Height:  5' (1 524 m)       Temp  Min: 95 9 °F (35 5 °C)  Max: 96 8 °F (36 °C)  IBW: 45 5 kg  Height: 5' (152 4 cm)  Body mass index is 26 95 kg/m²      Laboratory and Diagnostics:  Results from last 7 days   Lab Units 07/10/20  1150   WBC Thousand/uL 4 55   HEMOGLOBIN g/dL 9 9*   HEMATOCRIT % 30 6*   PLATELETS Thousands/uL 170   NEUTROS PCT % 86*   MONOS PCT % 2*     Results from last 7 days   Lab Units 07/10/20  1150   SODIUM mmol/L 133*   POTASSIUM mmol/L 4 3   CHLORIDE mmol/L 97*   CO2 mmol/L 30   ANION GAP mmol/L 6   BUN mg/dL 14   CREATININE mg/dL 0 96   CALCIUM mg/dL 8 4   GLUCOSE RANDOM mg/dL 122   ALT U/L 17   AST U/L 25   ALK PHOS U/L 47   ALBUMIN g/dL 2 6*   TOTAL BILIRUBIN mg/dL 0 30     Results from last 7 days   Lab Units 07/10/20  1150   MAGNESIUM mg/dL 1 6                   ABG:  Results from last 7 days   Lab Units 07/10/20  1543   PH ART  7 382   PCO2 ART mm Hg 54 0*   PO2 ART mm Hg 282 4*   HCO3 ART mmol/L 31 4*   BASE EXC ART mmol/L 5 1   ABG SOURCE  Radial, Right     VBG:  Results from last 7 days   Lab Units 07/10/20  1543   ABG SOURCE  Radial, Right           Micro:  Sputum culture pending        EKG:  Normal sinus rhythm  Imaging: I have personally reviewed pertinent reports  Historical Information   Past Medical History:   Diagnosis Date    Bradycardia     COPD (chronic obstructive pulmonary disease) (HCC)     uses O2 at 1L every 24 hrs      Disease of thyroid gland     hypo    Hyperlipidemia     Hypertension     Intellectual disability     verbal    Schizo affective schizophrenia [facility])      Past Surgical History:   Procedure Laterality Date    INSERT / REPLACE / REMOVE PACEMAKER  2009    [place] DENTAL SURGERY PROCEDURE N/A 1/15/2019    Procedure: COMPLETE ORAL REHABILITATION: FULL MOUTH DENTAL X-RAYS, PLANING AND SCALING, COMPOSITE RESTORATIONS: #7-MFL, #5-O, EXTRACTION: #4, AMALGAM RESTORATION: #31-B,;  Surgeon: Andreia Gonzales DMD;  Location: [facility]IN OR;  Service: Oral Surgery     Social History   Social History     Substance and Sexual Activity   Alcohol Use No     Social History     Substance and Sexual Activity   Drug Use No     Social History     Tobacco Use   Smoking Status Never Smoker Smokeless Tobacco Never Used       Family History:   History reviewed  No pertinent family history  I have reviewed this patient's family history and commented on sigificant items within the HPI      Medications:  Current Facility-Administered Medications   Medication Dose Route Frequency    acetaminophen (TYLENOL) oral suspension 650 mg  650 mg Oral Q6H PRN    [START ON 7/11/2020] amLODIPine (NORVASC) tablet 5 mg  5 mg Per NG Tube Daily    atorvastatin (LIPITOR) tablet 10 mg  10 mg Per NG Tube Daily With Dinner    budesonide (PULMICORT) inhalation solution 0 5 mg  0 5 mg Nebulization BID    [START ON 7/11/2020] enoxaparin (LOVENOX) subcutaneous injection 40 mg  40 mg Subcutaneous Daily    [START ON 7/11/2020] levothyroxine tablet 75 mcg  75 mcg Oral Early Morning    multi-electrolyte (PLASMALYTE-A/ISOLYTE-S PH 7 4) IV solution  75 mL/hr Intravenous Continuous    piperacillin-tazobactam (ZOSYN) 3 375 g in sodium chloride 0 9 % 100 mL IVPB  3 375 g Intravenous Q6H    propofol (DIPRIVAN) 1000 mg in 100 mL infusion (premix)  5-50 mcg/kg/min Intravenous Titrated    senna oral syrup 8 8 mg  8 8 mg Per NG Tube BID     Home medications:  Prior to Admission Medications   Prescriptions Last Dose Informant Patient Reported? Taking?    Promethazine-Codeine 6 25-10 MG/5ML SOLN Unknown at Unknown time  Yes No   Sig: Take by mouth   acetaminophen (TYLENOL) 500 mg tablet Unknown at Unknown time  Yes No   Sig: Take 500 mg by mouth every 6 (six) hours as needed for mild pain   albuterol (PROVENTIL HFA,VENTOLIN HFA) 90 mcg/act inhaler Unknown at Unknown time  Yes No   Sig: Inhale 2 puffs every 6 (six) hours as needed for wheezing   amLODIPine (NORVASC) 5 mg tablet 7/9/2020 at Unknown time  Yes Yes   Sig: Take 5 mg by mouth daily   atorvastatin (LIPITOR) 10 mg tablet 7/9/2020 at Unknown time  Yes Yes   Sig: Take 10 mg by mouth daily   budesonide (PULMICORT) 0 5 mg/2 mL nebulizer solution Unknown at Unknown time  Yes No Sig: Take 0 5 mg by nebulization 2 (two) times a day Rinse mouth after use  dextromethorphan 15 MG/5ML syrup Unknown at Unknown time  Yes No   Sig: Take 10 mL by mouth 4 (four) times a day as needed for cough   divalproex sodium (DEPAKOTE) 250 mg EC tablet 7/9/2020 at Unknown time  Yes Yes   Sig: Take 250 mg by mouth every 12 (twelve) hours Takes 1 tab a m  And 2 tabs at hs    docusate sodium (COLACE) 100 mg capsule 7/9/2020 at Unknown time  Yes Yes   Sig: Take 100 mg by mouth 2 (two) times a day   fluticasone-salmeterol (ADVAIR) 500-50 mcg/dose inhaler 7/9/2020 at Unknown time  Yes Yes   Sig: Inhale 1 puff 2 (two) times a day Rinse mouth after use     guaifenesin-codeine (GUAIFENESIN AC) 100-10 MG/5ML liquid Unknown at Unknown time  Yes No   Sig: Take 5 mL by mouth 3 (three) times a day as needed for cough   ibuprofen (MOTRIN) 200 mg tablet Unknown at Unknown time  Yes No   Sig: Take by mouth every 6 (six) hours as needed for mild pain   irbesartan-hydrochlorothiazide (AVALIDE) 150-12 5 MG per tablet Unknown at Unknown time  Yes No   Sig: Take 1 tablet by mouth daily   levothyroxine 75 mcg tablet 7/10/2020 at Unknown time  Yes Yes   Sig: Take 75 mcg by mouth daily   loperamide (IMODIUM) 2 mg capsule Unknown at Unknown time  Yes No   Sig: Take 2 mg by mouth 4 (four) times a day as needed for diarrhea   magnesium hydroxide (MILK OF MAGNESIA) 400 mg/5 mL oral suspension Unknown at Unknown time  Yes No   Sig: Take by mouth daily as needed for constipation   pseudoephedrine (SUDAFED) 30 mg tablet Unknown at Unknown time  Yes No   Sig: Take 60 mg by mouth every 4 (four) hours as needed for congestion   risperiDONE (RisperDAL) 2 mg tablet 7/9/2020 at Unknown time  Yes Yes   Sig: Take 2 mg by mouth 2 (two) times a day   umeclidinium bromide (Incruse Ellipta) 62 5 mcg/inh AEPB inhaler Unknown at Unknown time  Yes No   Sig: Inhale 1 puff daily   white petrolatum-mineral oil (EUCERIN) cream Unknown at Unknown time  Yes No Sig: Apply topically 2 (two) times a day      Facility-Administered Medications: None     Allergies:  No Known Allergies    ------------------------------------------------------------------------------------------------------------  Advance Directive and Living Will:      Power of :    POLST:    ------------------------------------------------------------------------------------------------------------  Anticipated Length of Stay is > 2 midnights    Care Time Delivered:   No Critical Care time spent       [facility], DO        Portions of the record may have been created with voice recognition software  Occasional wrong word or "sound a like" substitutions may have occurred due to the inherent limitations of voice recognition software    Read the chart carefully and recognize, using context, where substitutions have occurred

## 2020-07-10 NOTE — ASSESSMENT & PLAN NOTE
Likely from the extubation process after the dental procedure  -continue with Zosyn  - WBC normal on admission however will need to be monitored closely  - sputum culture pending

## 2020-07-10 NOTE — PROGRESS NOTES
Nijulio Jeffery Page updated by phone  She states that she shares power of  with her uncle Nunu Landrum  They have been added to patient's emergency contacts

## 2020-07-10 NOTE — RESPIRATORY THERAPY NOTE
Patient remains on full support at this time  Alarms on and functioning  bvm at bedside with peep valve and syringe, ett patent and stable  Pt appears to be comfortably breathing without distress at this time

## 2020-07-10 NOTE — PLAN OF CARE
Problem: PAIN - ADULT  Goal: Verbalizes/displays adequate comfort level or baseline comfort level  Description  Interventions:  - Encourage patient to monitor pain and request assistance  - Assess pain using appropriate pain scale  - Administer analgesics based on type and severity of pain and evaluate response  - Implement non-pharmacological measures as appropriate and evaluate response  - Consider cultural and social influences on pain and pain management  - Notify physician/advanced practitioner if interventions unsuccessful or patient reports new pain  Outcome: Progressing     Problem: SAFETY ADULT  Goal: Patient will remain free of falls  Description  INTERVENTIONS:  - Assess patient frequently for physical needs  -  Identify cognitive and physical deficits and behaviors that affect risk of falls    -  Stanley fall precautions as indicated by assessment   - Educate patient/family on patient safety including physical limitations  - Instruct patient to call for assistance with activity based on assessment  - Modify environment to reduce risk of injury  - Consider OT/PT consult to assist with strengthening/mobility  Outcome: Progressing     Problem: DISCHARGE PLANNING  Goal: Discharge to home or other facility with appropriate resources  Description  INTERVENTIONS:  - Identify barriers to discharge w/patient and caregiver  - Arrange for needed discharge resources and transportation as appropriate  - Identify discharge learning needs (meds, wound care, etc )  - Arrange for interpretive services to assist at discharge as needed  - Refer to Case Management Department for coordinating discharge planning if the patient needs post-hospital services based on physician/advanced practitioner order or complex needs related to functional status, cognitive ability, or social support system  Outcome: Progressing     Problem: Knowledge Deficit  Goal: Patient/family/caregiver demonstrates understanding of disease process, treatment plan, medications, and discharge instructions  Description  Complete learning assessment and assess knowledge base    Interventions:  - Provide teaching at level of understanding  - Provide teaching via preferred learning methods  Outcome: Progressing     Problem: RESPIRATORY - ADULT  Goal: Achieves optimal ventilation and oxygenation  Description  INTERVENTIONS:  - Assess for changes in respiratory status  - Assess for changes in mentation and behavior  - Position to facilitate oxygenation and minimize respiratory effort  - Oxygen administered by appropriate delivery if ordered  - Initiate smoking cessation education as indicated  - Encourage broncho-pulmonary hygiene including cough, deep breathe, Incentive Spirometry  - Assess the need for suctioning and aspirate as needed  - Assess and instruct to report SOB or any respiratory difficulty  - Respiratory Therapy support as indicated  Outcome: Progressing     Problem: GENITOURINARY - ADULT  Goal: Maintains or returns to baseline urinary function  Description  INTERVENTIONS:  - Assess urinary function  - Encourage oral fluids to ensure adequate hydration if ordered  - Administer IV fluids as ordered to ensure adequate hydration  - Administer ordered medications as needed  - Offer frequent toileting  - Follow urinary retention protocol if ordered  Outcome: Progressing

## 2020-07-10 NOTE — RESPIRATORY THERAPY NOTE
RT Protocol Note  Rigoberto Mort 64 y o  female MRN: [id number]  Unit/Bed#: ICU 07 Encounter: [id number]    Assessment    Active Problems:    * No active hospital problems  *    Home Pulmonary Medications:    pulmicort albuterol    Home Devices/Therapy: Home O2, Other (Comment)(1 lpm oxygen,  mdi albuterol, pulmicort)    Past Medical History:   Diagnosis Date    Bradycardia     COPD (chronic obstructive pulmonary disease) (HCC)     uses O2 at 1L every 24 hrs   Disease of thyroid gland     hypo    Hyperlipidemia     Hypertension     Intellectual disability     verbal    Schizo affective schizophrenia ([facility] [address] )      Social History     Socioeconomic History    Marital status: Unknown     Spouse name: None    Number of children: None    Years of education: None    Highest education level: None   Occupational History    None   Social Needs    Financial resource strain: None    Food insecurity:     Worry: None     Inability: None    Transportation needs:     Medical: None     Non-medical: None   Tobacco Use    Smoking status: Never Smoker    Smokeless tobacco: Never Used   Substance and Sexual Activity    Alcohol use: No    Drug use: No    Sexual activity: None   Lifestyle    Physical activity:     Days per week: None     Minutes per session: None    Stress: None   Relationships    Social connections:     Talks on phone: None     Gets together: None     Attends Hinduism service: None     Active member of club or organization: None     Attends meetings of clubs or organizations: None     Relationship status: None    Intimate partner violence:     Fear of current or ex partner: None     Emotionally abused: None     Physically abused: None     Forced sexual activity: None   Other Topics Concern    None   Social History Narrative    None       Subjective    Subjective Data: no apparent resp distress      Objective    Physical Exam:   Assessment Type: Assess only  General Appearance: Eyes do not open to any stimulus  Respiratory Pattern: Assisted  Chest Assessment: Trachea midline, Chest expansion symmetrical  Bilateral Breath Sounds: Diminished  R Breath Sounds: Diminished  L Breath Sounds: Diminished  Location Specific: No  Cough: None  O2 Device: vent    Vitals:  Blood pressure 156/74, pulse 72, temperature (!) 96 4 °F (35 8 °C), temperature source Temporal, resp  rate 18, height 5' (1 524 m), weight 62 6 kg (138 lb 0 1 oz), SpO2 100 %  Results from last 7 days   Lab Units 07/10/20  1129   PH ART  7 338*   PCO2 ART mm Hg 60 4*   PO2 ART mm Hg 292 4*   HCO3 ART mmol/L 31 7*   BASE EXC ART mmol/L 4 6   O2 CONTENT ART mL/dL 16 0   O2 HGB, ARTERIAL % 99 3*   ABG SOURCE  Radial, Left       Imaging and other studies: I have personally reviewed pertinent reports        O2 Device: vent     Plan    Respiratory Plan: Home Bronchodilator Patient pathway, Vent/NIV/HFNC        Resp Comments: no distress noted

## 2020-07-10 NOTE — ED NOTES
Post CT a pre-hospital blunt was located between layers of bedding      Karen Luque RN  07/10/20 3182

## 2020-07-10 NOTE — ED PROVIDER NOTES
History  Chief Complaint   Patient presents with    Post-op Problem     Patient was at [facility] for a dental procedure  She was intubated  She is brought in here after a falied extubation  Patient arrives with Dr Kayleen Subramanian Anethesiologist  Patient was re-intubated with no medication but given 20mg of Rocuronium and Midazolam 2mg at 11am         Pt with a hx of MR presents with EMS after a failed extubation  She was at the [employer] [facility] for a elective dental procedure, but desaturated after extubation  Pt was reintubated by anesthesiologist, and brought to the ER  Pt was most recently given Rocuronium and Versed at 11am  Pt also has a hx of pulm fibrosis  History provided by: physician  History limited by:  Patient unresponsive   used: No        Prior to Admission Medications   Prescriptions Last Dose Informant Patient Reported? Taking? Promethazine-Codeine 6 25-10 MG/5ML SOLN   Yes Yes   Sig: Take by mouth   acetaminophen (TYLENOL) 500 mg tablet   Yes Yes   Sig: Take 500 mg by mouth every 6 (six) hours as needed for mild pain   albuterol (PROVENTIL HFA,VENTOLIN HFA) 90 mcg/act inhaler   Yes No   Sig: Inhale 2 puffs every 6 (six) hours as needed for wheezing   amLODIPine (NORVASC) 5 mg tablet 7/9/2020 at Unknown time  Yes Yes   Sig: Take 5 mg by mouth daily   atorvastatin (LIPITOR) 10 mg tablet 7/9/2020 at Unknown time  Yes Yes   Sig: Take 10 mg by mouth daily   budesonide (PULMICORT) 0 5 mg/2 mL nebulizer solution   Yes Yes   Sig: Take 0 5 mg by nebulization 2 (two) times a day Rinse mouth after use  dextromethorphan 15 MG/5ML syrup   Yes Yes   Sig: Take 10 mL by mouth 4 (four) times a day as needed for cough   divalproex sodium (DEPAKOTE) 250 mg EC tablet 7/9/2020 at Unknown time  Yes Yes   Sig: Take 250 mg by mouth every 12 (twelve) hours Takes 1 tab a m   And 2 tabs at hs    docusate sodium (COLACE) 100 mg capsule 7/9/2020 at Unknown time  Yes Yes   Sig: Take 100 mg by mouth 2 (two) times a day   fluticasone-salmeterol (ADVAIR) 500-50 mcg/dose inhaler 7/9/2020 at Unknown time  Yes Yes   Sig: Inhale 1 puff 2 (two) times a day Rinse mouth after use  guaifenesin-codeine (GUAIFENESIN AC) 100-10 MG/5ML liquid   Yes Yes   Sig: Take 5 mL by mouth 3 (three) times a day as needed for cough   ibuprofen (MOTRIN) 200 mg tablet   Yes Yes   Sig: Take by mouth every 6 (six) hours as needed for mild pain   irbesartan-hydrochlorothiazide (AVALIDE) 150-12 5 MG per tablet   Yes No   Sig: Take 1 tablet by mouth daily   levothyroxine 75 mcg tablet 7/10/2020 at Unknown time  Yes Yes   Sig: Take 75 mcg by mouth daily   loperamide (IMODIUM) 2 mg capsule   Yes Yes   Sig: Take 2 mg by mouth 4 (four) times a day as needed for diarrhea   magnesium hydroxide (MILK OF MAGNESIA) 400 mg/5 mL oral suspension   Yes Yes   Sig: Take by mouth daily as needed for constipation   pseudoephedrine (SUDAFED) 30 mg tablet   Yes Yes   Sig: Take 60 mg by mouth every 4 (four) hours as needed for congestion   risperiDONE (RisperDAL) 2 mg tablet 7/9/2020 at Unknown time  Yes Yes   Sig: Take 2 mg by mouth 2 (two) times a day   umeclidinium bromide (Incruse Ellipta) 62 5 mcg/inh AEPB inhaler   Yes Yes   Sig: Inhale 1 puff daily   white petrolatum-mineral oil (EUCERIN) cream Unknown at Unknown time  Yes No   Sig: Apply topically 2 (two) times a day      Facility-Administered Medications: None       Past Medical History:   Diagnosis Date    Bradycardia     COPD (chronic obstructive pulmonary disease) (HCC)     uses O2 at 1L every 24 hrs      Disease of thyroid gland     hypo    Hyperlipidemia     Hypertension     Intellectual disability     verbal    Schizo affective schizophrenia [facility])        Past Surgical History:   Procedure Laterality Date    INSERT / REPLACE / REMOVE PACEMAKER  2009    SD DENTAL SURGERY PROCEDURE N/A 1/15/2019    Procedure: COMPLETE ORAL REHABILITATION: FULL MOUTH DENTAL X-RAYS, PLANING AND SCALING, COMPOSITE RESTORATIONS: #7-MFL, #5-O, EXTRACTION: #4, AMALGAM RESTORATION: #31-B,;  Surgeon: Lilliana Mariee DMD;  Location: [address] [place] [address];  Service: Oral Surgery       History reviewed  No pertinent family history  I have reviewed and agree with the history as documented  E-Cigarette/Vaping     E-Cigarette/Vaping Substances     Social History     Tobacco Use    Smoking status: Never Smoker    Smokeless tobacco: Never Used   Substance Use Topics    Alcohol use: No    Drug use: No       Review of Systems   Unable to perform ROS: Patient unresponsive       Physical Exam  Physical Exam   Constitutional: She appears well-developed and well-nourished  HENT:   Head: Normocephalic and atraumatic  Eyes: Pupils are equal, round, and reactive to light  Conjunctivae and EOM are normal    Cardiovascular: Normal rate and regular rhythm  Pulmonary/Chest:   intubated   Abdominal: Soft  Bowel sounds are normal    Nursing note and vitals reviewed        Vital Signs  ED Triage Vitals   Temperature Pulse Respirations Blood Pressure SpO2   07/10/20 1152 07/10/20 1152 07/10/20 1152 07/10/20 1152 07/10/20 1136   (!) 96 8 °F (36 °C) 80 18 145/80 100 %      Temp src Heart Rate Source Patient Position - Orthostatic VS BP Location FiO2 (%)   -- -- -- -- --             Pain Score       --                  Vitals:    07/10/20 1152   BP: 145/80   Pulse: 80         Visual Acuity      ED Medications  Medications   propofol (DIPRIVAN) 1000 mg in 100 mL infusion (premix) (16 28 mcg/kg/min × 60 4 kg Intravenous Rate/Dose Change 7/10/20 1312)   magnesium sulfate 2 g/50 mL IVPB (premix) 2 g (has no administration in time range)   propofol (DIPRIVAN) 200 MG/20ML bolus injection 50 mg (50 mg Intravenous Given 7/10/20 1120)   propofol (DIPRIVAN) 200 MG/20ML bolus injection 50 mg (50 mg Intravenous Given 7/10/20 1215)       Diagnostic Studies  Results Reviewed     Procedure Component Value Units Date/Time    Blood gas, arterial [[id number]]     Lab Status:  No result Specimen:  Blood, Arterial     Comprehensive metabolic panel [[id number]]  (Abnormal) Collected:  07/10/20 1150    Lab Status:  Final result Specimen:  Blood from Arm, Left Updated:  07/10/20 1211     Sodium 133 mmol/L      Potassium 4 3 mmol/L      Chloride 97 mmol/L      CO2 30 mmol/L      ANION GAP 6 mmol/L      BUN 14 mg/dL      Creatinine 0 96 mg/dL      Glucose 122 mg/dL      Calcium 8 4 mg/dL      AST 25 U/L      ALT 17 U/L      Alkaline Phosphatase 47 U/L      Total Protein 6 8 g/dL      Albumin 2 6 g/dL      Total Bilirubin 0 30 mg/dL      eGFR 65 ml/min/1 73sq m     Narrative:       National Kidney Disease Foundation guidelines for Chronic Kidney Disease (CKD):     Stage 1 with normal or high GFR (GFR > 90 mL/min/1 73 square meters)    Stage 2 Mild CKD (GFR = 60-89 mL/min/1 73 square meters)    Stage 3A Moderate CKD (GFR = 45-59 mL/min/1 73 square meters)    Stage 3B Moderate CKD (GFR = 30-44 mL/min/1 73 square meters)    Stage 4 Severe CKD (GFR = 15-29 mL/min/1 73 square meters)    Stage 5 End Stage CKD (GFR <15 mL/min/1 73 square meters)  Note: GFR calculation is accurate only with a steady state creatinine    Magnesium [[id number]]  (Normal) Collected:  07/10/20 1150    Lab Status:  Final result Specimen:  Blood from Arm, Left Updated:  07/10/20 1211     Magnesium 1 6 mg/dL     CBC and differential [[id number]]  (Abnormal) Collected:  07/10/20 1150    Lab Status:  Final result Specimen:  Blood from Arm, Left Updated:  07/10/20 1156     WBC 4 55 Thousand/uL      RBC 3 16 Million/uL      Hemoglobin 9 9 g/dL      Hematocrit 30 6 %      MCV 97 fL      MCH 31 3 pg      MCHC 32 4 g/dL      RDW 12 0 %      MPV 10 8 fL      Platelets 249 Thousands/uL      nRBC 0 /100 WBCs      Neutrophils Relative 86 %      Immat GRANS % 0 %      Lymphocytes Relative 11 %      Monocytes Relative 2 %      Eosinophils Relative 1 %      Basophils Relative 0 %      Neutrophils Absolute 3 89 Thousands/µL      Immature Grans Absolute 0 01 Thousand/uL      Lymphocytes Absolute 0 48 Thousands/µL      Monocytes Absolute 0 10 Thousand/µL      Eosinophils Absolute 0 06 Thousand/µL      Basophils Absolute 0 01 Thousands/µL     Blood gas, arterial [[id number]]  (Abnormal) Collected:  07/10/20 1129    Lab Status:  Final result Specimen:  Blood, Arterial from Radial, Left Updated:  07/10/20 1139     pH, Arterial 7 338     PH ART TC 7 357     pCO2, Arterial 60 4 mm Hg      PCO2 (TC) Arterial 57 1 mm Hg      pO2, Arterial 292 4 mm Hg      PO2 (TC) Arterial 286 2 mm Hg      HCO3, Arterial 31 7 mmol/L      Base Excess, Arterial 4 6 mmol/L      O2 Content, Arterial 16 0 mL/dL      O2 HGB,Arterial  99 3 %      SOURCE Radial, Left     Temperature 96 2 Degrees Fehrenheit      Vent Type- AC AC     AC Rate 12     Tidal Volume 350 ml      Inspired Air (FIO2) 50     PEEP 5                 CT chest without contrast   Final Result by Lee Craven MD (07/10 1319)      No swallowed or aspirated foreign body  The 4 mm linear foreign body projecting over the superior mediastinum on the chest radiograph is a capped needle within the bedding posterior to the patient  Lungs suboptimally evaluated due to respiratory motion with honeycombing at the periphery of the left lung due to pulmonary fibrosis  Left greater than right upper lobe consolidation, dependent in the left upper lobe  The dependent location suggest aspiration  Small pleural effusions  Gastric distention  Workstation performed: [id number]         XR chest 1 view portable   ED Interpretation by Jorje Andrews DO (07/10 1206)   ETT at toribio  Will pull back 3cm      Final Result by Lee Craven MD (07/10 6885)      Low endotracheal tube  4 4 cm linear metallic opacity over the superior mediastinum  Examination of the patient is needed to make sure this is outside the body, either on the anterior or posterior chest wall    If there is no metallic object outside the body, consider CT to    evaluate for swallowed versus aspirated foreign body  Ground glass opacity throughout the left lung, question aspiration  Trace right effusion  I personally discussed this study with Chuck Peterson on 7/10/2020 at 12: 2 7 PM                Workstation performed: [id number]                    Procedures  Procedures         ED Course                                             MDM  Number of Diagnoses or Management Options  Acute respiratory failure requiring reintubation [facility]):   Diagnosis management comments: D/w Dr Michelle Falcon who accepts pt to ICU  Amount and/or Complexity of Data Reviewed  Clinical lab tests: ordered and reviewed  Tests in the radiology section of CPT®: ordered and reviewed    Risk of Complications, Morbidity, and/or Mortality  Presenting problems: high  Diagnostic procedures: high  Management options: high    Patient Progress  Patient progress: stable        Disposition  Final diagnoses:   Acute respiratory failure requiring reintubation ([address] )     Time reflects when diagnosis was documented in both MDM as applicable and the Disposition within this note     Time User Action Codes Description Comment    7/10/2020 11:49 AM Jessica Chapman [J96 [id number]] Acute respiratory failure requiring reintubation [facility])       ED Disposition     ED Disposition Condition Date/Time Comment    Admit Stable Fri Jul 10, 2020 11:49 AM Case was discussed with Dr Michelle Falcon and Lora Bloch NP and the patient's admission status was agreed to be Admission Status: inpatient status to the service of Dr Michelle Falcon   Follow-up Information    None         Patient's Medications   Discharge Prescriptions    No medications on file     No discharge procedures on file      PDMP Review     None          ED Provider  Electronically Signed by           Melina Moore DO  07/10/20 9481

## 2020-07-10 NOTE — PLAN OF CARE
Problem: RESPIRATORY - ADULT  Goal: Achieves optimal ventilation and oxygenation  Description  INTERVENTIONS:  - Assess for changes in respiratory status  - Assess for changes in mentation and behavior  - Position to facilitate oxygenation and minimize respiratory effort  - Oxygen administered by appropriate delivery if ordered  - Initiate smoking cessation education as indicated  - Encourage broncho-pulmonary hygiene including cough, deep breathe, Incentive Spirometry  - Assess the need for suctioning and aspirate as needed  - Assess and instruct to report SOB or any respiratory difficulty  - Respiratory Therapy support as indicated  - wean from mechanical ventilation  Outcome: Progressing

## 2020-07-10 NOTE — ASSESSMENT & PLAN NOTE
Likely secondary to pulmonary fibrosis versus aspiration pneumonia versus overlap  Patient arrived intubated from her dental surgery after failure and extubation  Patient is known to be on 1 L O2 at baseline  Patient has pulmonary fibrosis with suspected right middle lobe aspiration pneumonia on CT scan    ABG on arrival shows respiratory acidemia with pH of 7 338 and pCO2 of 64      - repeat ABG is improved  -Continue with propofol sedation  -Continue with current vent settings AC/VC 16/325/50/5  - continue with Zosyn  - wean as tolerated  - continue with home Pulmicort

## 2020-07-10 NOTE — ASSESSMENT & PLAN NOTE
Patient intubated prior to dental cleaning  Extubated post procedure but had increased work of breathing and therefore re-intubated  Likely secondary to hx of pulmonary fibrosis with possible aspiration pneumonia/pneumonitis  Patient is known to be on 1 L O2 at baseline  Patient has pulmonary fibrosis with suspected right middle lobe aspiration pneumonia on CT scan  ABG on arrival shows mild respiratory acidosis with pH of 7 338 and pCO2 of 64   Likely component of chronic hypercapnia   · Continue with current vent settings AC/VC 16/325/50/5  · continue with Zosyn, currently day 2  · Propofol transitioned to precedex overnight, will wean to off  · SAT with SBT  · continue with home Pulmicort

## 2020-07-11 ENCOUNTER — APPOINTMENT (INPATIENT)
Dept: RADIOLOGY | Facility: HOSPITAL | Age: 60
DRG: 207 | End: 2020-07-11
Payer: MEDICARE

## 2020-07-11 PROBLEM — J96.22 ACUTE ON CHRONIC RESPIRATORY FAILURE WITH HYPERCAPNIA (HCC): Status: ACTIVE | Noted: 2020-07-10

## 2020-07-11 LAB
ANION GAP SERPL CALCULATED.3IONS-SCNC: 5 MMOL/L (ref 4–13)
ANION GAP SERPL CALCULATED.3IONS-SCNC: 7 MMOL/L (ref 4–13)
ARTERIAL PATENCY WRIST A: YES
ARTERIAL PATENCY WRIST A: YES
BACTERIA UR QL AUTO: ABNORMAL /HPF
BASE EX.OXY STD BLDV CALC-SCNC: 95.6 % (ref 60–80)
BASE EXCESS BLDA CALC-SCNC: 3.6 MMOL/L
BASE EXCESS BLDA CALC-SCNC: 3.9 MMOL/L
BASE EXCESS BLDV CALC-SCNC: 4.4 MMOL/L
BASOPHILS # BLD AUTO: 0.02 THOUSANDS/ΜL (ref 0–0.1)
BASOPHILS # BLD AUTO: 0.02 THOUSANDS/ΜL (ref 0–0.1)
BASOPHILS NFR BLD AUTO: 0 % (ref 0–1)
BASOPHILS NFR BLD AUTO: 0 % (ref 0–1)
BILIRUB UR QL STRIP: NEGATIVE
BODY TEMPERATURE: 95.5 DEGREES FEHRENHEIT
BODY TEMPERATURE: 99.8 DEGREES FEHRENHEIT
BUN SERPL-MCNC: 13 MG/DL (ref 5–25)
BUN SERPL-MCNC: 17 MG/DL (ref 5–25)
CALCIUM SERPL-MCNC: 8.6 MG/DL (ref 8.3–10.1)
CALCIUM SERPL-MCNC: 8.6 MG/DL (ref 8.3–10.1)
CHLORIDE SERPL-SCNC: 100 MMOL/L (ref 100–108)
CHLORIDE SERPL-SCNC: 101 MMOL/L (ref 100–108)
CLARITY UR: ABNORMAL
CO2 SERPL-SCNC: 29 MMOL/L (ref 21–32)
CO2 SERPL-SCNC: 31 MMOL/L (ref 21–32)
COLOR UR: ABNORMAL
CREAT SERPL-MCNC: 0.86 MG/DL (ref 0.6–1.3)
CREAT SERPL-MCNC: 1.29 MG/DL (ref 0.6–1.3)
CRP SERPL QL: 6.9 MG/L
EOSINOPHIL # BLD AUTO: 0.01 THOUSAND/ΜL (ref 0–0.61)
EOSINOPHIL # BLD AUTO: 0.07 THOUSAND/ΜL (ref 0–0.61)
EOSINOPHIL NFR BLD AUTO: 0 % (ref 0–6)
EOSINOPHIL NFR BLD AUTO: 1 % (ref 0–6)
ERYTHROCYTE [DISTWIDTH] IN BLOOD BY AUTOMATED COUNT: 11.9 % (ref 11.6–15.1)
ERYTHROCYTE [DISTWIDTH] IN BLOOD BY AUTOMATED COUNT: 12.2 % (ref 11.6–15.1)
GFR SERPL CREATININE-BSD FRML MDRD: 46 ML/MIN/1.73SQ M
GFR SERPL CREATININE-BSD FRML MDRD: 75 ML/MIN/1.73SQ M
GLUCOSE SERPL-MCNC: 117 MG/DL (ref 65–140)
GLUCOSE SERPL-MCNC: 126 MG/DL (ref 65–140)
GLUCOSE UR STRIP-MCNC: NEGATIVE MG/DL
HCO3 BLDA-SCNC: 30.8 MMOL/L (ref 22–28)
HCO3 BLDA-SCNC: 31.1 MMOL/L (ref 22–28)
HCO3 BLDV-SCNC: 27.6 MMOL/L (ref 24–30)
HCT VFR BLD AUTO: 29.4 % (ref 34.8–46.1)
HCT VFR BLD AUTO: 32.9 % (ref 34.8–46.1)
HGB BLD-MCNC: 10.9 G/DL (ref 11.5–15.4)
HGB BLD-MCNC: 9.8 G/DL (ref 11.5–15.4)
HGB UR QL STRIP.AUTO: ABNORMAL
HOROWITZ INDEX BLDA+IHG-RTO: 40 MM[HG]
HOROWITZ INDEX BLDA+IHG-RTO: 40 MM[HG]
IMM GRANULOCYTES # BLD AUTO: 0.01 THOUSAND/UL (ref 0–0.2)
IMM GRANULOCYTES # BLD AUTO: 0.01 THOUSAND/UL (ref 0–0.2)
IMM GRANULOCYTES NFR BLD AUTO: 0 % (ref 0–2)
IMM GRANULOCYTES NFR BLD AUTO: 0 % (ref 0–2)
KETONES UR STRIP-MCNC: ABNORMAL MG/DL
LEUKOCYTE ESTERASE UR QL STRIP: ABNORMAL
LYMPHOCYTES # BLD AUTO: 0.82 THOUSANDS/ΜL (ref 0.6–4.47)
LYMPHOCYTES # BLD AUTO: 0.91 THOUSANDS/ΜL (ref 0.6–4.47)
LYMPHOCYTES NFR BLD AUTO: 14 % (ref 14–44)
LYMPHOCYTES NFR BLD AUTO: 19 % (ref 14–44)
MAGNESIUM SERPL-MCNC: 2.1 MG/DL (ref 1.6–2.6)
MAGNESIUM SERPL-MCNC: 2.4 MG/DL (ref 1.6–2.6)
MCH RBC QN AUTO: 31.6 PG (ref 26.8–34.3)
MCH RBC QN AUTO: 32.2 PG (ref 26.8–34.3)
MCHC RBC AUTO-ENTMCNC: 33.1 G/DL (ref 31.4–37.4)
MCHC RBC AUTO-ENTMCNC: 33.3 G/DL (ref 31.4–37.4)
MCV RBC AUTO: 95 FL (ref 82–98)
MCV RBC AUTO: 97 FL (ref 82–98)
MONOCYTES # BLD AUTO: 0.32 THOUSAND/ΜL (ref 0.17–1.22)
MONOCYTES # BLD AUTO: 0.9 THOUSAND/ΜL (ref 0.17–1.22)
MONOCYTES NFR BLD AUTO: 16 % (ref 4–12)
MONOCYTES NFR BLD AUTO: 7 % (ref 4–12)
NEUTROPHILS # BLD AUTO: 3.42 THOUSANDS/ΜL (ref 1.85–7.62)
NEUTROPHILS # BLD AUTO: 3.94 THOUSANDS/ΜL (ref 1.85–7.62)
NEUTS SEG NFR BLD AUTO: 69 % (ref 43–75)
NEUTS SEG NFR BLD AUTO: 74 % (ref 43–75)
NITRITE UR QL STRIP: POSITIVE
NON-SQ EPI CELLS URNS QL MICRO: ABNORMAL /HPF
NRBC BLD AUTO-RTO: 0 /100 WBCS
NRBC BLD AUTO-RTO: 0 /100 WBCS
O2 CT BLDA-SCNC: 13.7 ML/DL (ref 16–23)
O2 CT BLDA-SCNC: 16.3 ML/DL (ref 16–23)
O2 CT BLDV-SCNC: 14.5 ML/DL
OXYHGB MFR BLDA: 94.2 % (ref 94–97)
OXYHGB MFR BLDA: 96.2 % (ref 94–97)
PCO2 BLDA: 59 MM HG (ref 36–44)
PCO2 BLDA: 61 MM HG (ref 36–44)
PCO2 BLDV: 35.9 MM HG (ref 42–50)
PCO2 TEMP ADJ BLDA: 54.8 MM HG (ref 36–44)
PCO2 TEMP ADJ BLDA: 62.9 MM HG (ref 36–44)
PEEP RESPIRATORY: 5 CM[H2O]
PEEP RESPIRATORY: 5 CM[H2O]
PH BLD: 7.32 [PH] (ref 7.35–7.45)
PH BLD: 7.36 [PH] (ref 7.35–7.45)
PH BLDA: 7.33 [PH] (ref 7.35–7.45)
PH BLDA: 7.33 [PH] (ref 7.35–7.45)
PH BLDV: 7.5 [PH] (ref 7.3–7.4)
PH UR STRIP.AUTO: 6.5 [PH]
PHOSPHATE SERPL-MCNC: 3.3 MG/DL (ref 2.7–4.5)
PHOSPHATE SERPL-MCNC: 4.8 MG/DL (ref 2.7–4.5)
PLATELET # BLD AUTO: 169 THOUSANDS/UL (ref 149–390)
PLATELET # BLD AUTO: 171 THOUSANDS/UL (ref 149–390)
PMV BLD AUTO: 11.1 FL (ref 8.9–12.7)
PMV BLD AUTO: 11.5 FL (ref 8.9–12.7)
PO2 BLD: 79.3 MM HG (ref 75–129)
PO2 BLD: 84.9 MM HG (ref 75–129)
PO2 BLDA: 75.7 MM HG (ref 75–129)
PO2 BLDA: 94.3 MM HG (ref 75–129)
PO2 BLDV: 131.6 MM HG (ref 35–45)
POTASSIUM SERPL-SCNC: 3.9 MMOL/L (ref 3.5–5.3)
POTASSIUM SERPL-SCNC: 4.4 MMOL/L (ref 3.5–5.3)
PRESSURE CONTROL: 25
PROCALCITONIN SERPL-MCNC: <0.05 NG/ML
PROT UR STRIP-MCNC: ABNORMAL MG/DL
RBC # BLD AUTO: 3.04 MILLION/UL (ref 3.81–5.12)
RBC # BLD AUTO: 3.45 MILLION/UL (ref 3.81–5.12)
RBC #/AREA URNS AUTO: ABNORMAL /HPF
SODIUM SERPL-SCNC: 134 MMOL/L (ref 136–145)
SODIUM SERPL-SCNC: 139 MMOL/L (ref 136–145)
SP GR UR STRIP.AUTO: >=1.03 (ref 1–1.03)
SPECIMEN SOURCE: ABNORMAL
SPECIMEN SOURCE: ABNORMAL
UROBILINOGEN UR QL STRIP.AUTO: 1 E.U./DL
VENT AC: 12
VENT AC: 12
VENT- AC: AC
VENT- AC: AC
VT SETTING VENT: 325 ML
WBC # BLD AUTO: 4.69 THOUSAND/UL (ref 4.31–10.16)
WBC # BLD AUTO: 5.76 THOUSAND/UL (ref 4.31–10.16)
WBC #/AREA URNS AUTO: ABNORMAL /HPF

## 2020-07-11 PROCEDURE — 83735 ASSAY OF MAGNESIUM: CPT | Performed by: PHYSICIAN ASSISTANT

## 2020-07-11 PROCEDURE — 80048 BASIC METABOLIC PNL TOTAL CA: CPT | Performed by: NURSE PRACTITIONER

## 2020-07-11 PROCEDURE — 84145 PROCALCITONIN (PCT): CPT | Performed by: INTERNAL MEDICINE

## 2020-07-11 PROCEDURE — 87086 URINE CULTURE/COLONY COUNT: CPT | Performed by: NURSE PRACTITIONER

## 2020-07-11 PROCEDURE — 85025 COMPLETE CBC W/AUTO DIFF WBC: CPT | Performed by: NURSE PRACTITIONER

## 2020-07-11 PROCEDURE — 99233 SBSQ HOSP IP/OBS HIGH 50: CPT | Performed by: INTERNAL MEDICINE

## 2020-07-11 PROCEDURE — 84100 ASSAY OF PHOSPHORUS: CPT | Performed by: NURSE PRACTITIONER

## 2020-07-11 PROCEDURE — 85025 COMPLETE CBC W/AUTO DIFF WBC: CPT | Performed by: PHYSICIAN ASSISTANT

## 2020-07-11 PROCEDURE — 36600 WITHDRAWAL OF ARTERIAL BLOOD: CPT

## 2020-07-11 PROCEDURE — 94640 AIRWAY INHALATION TREATMENT: CPT

## 2020-07-11 PROCEDURE — 94003 VENT MGMT INPAT SUBQ DAY: CPT

## 2020-07-11 PROCEDURE — 86140 C-REACTIVE PROTEIN: CPT | Performed by: NURSE PRACTITIONER

## 2020-07-11 PROCEDURE — 83735 ASSAY OF MAGNESIUM: CPT | Performed by: NURSE PRACTITIONER

## 2020-07-11 PROCEDURE — 80048 BASIC METABOLIC PNL TOTAL CA: CPT | Performed by: PHYSICIAN ASSISTANT

## 2020-07-11 PROCEDURE — 94760 N-INVAS EAR/PLS OXIMETRY 1: CPT

## 2020-07-11 PROCEDURE — 71045 X-RAY EXAM CHEST 1 VIEW: CPT

## 2020-07-11 PROCEDURE — 94644 CONT INHLJ TX 1ST HOUR: CPT

## 2020-07-11 PROCEDURE — 84100 ASSAY OF PHOSPHORUS: CPT | Performed by: PHYSICIAN ASSISTANT

## 2020-07-11 PROCEDURE — 81001 URINALYSIS AUTO W/SCOPE: CPT | Performed by: PHYSICIAN ASSISTANT

## 2020-07-11 PROCEDURE — 94150 VITAL CAPACITY TEST: CPT

## 2020-07-11 PROCEDURE — 82805 BLOOD GASES W/O2 SATURATION: CPT | Performed by: PHYSICIAN ASSISTANT

## 2020-07-11 PROCEDURE — 99291 CRITICAL CARE FIRST HOUR: CPT | Performed by: PHYSICIAN ASSISTANT

## 2020-07-11 RX ORDER — METHYLPREDNISOLONE SODIUM SUCCINATE 40 MG/ML
40 INJECTION, POWDER, LYOPHILIZED, FOR SOLUTION INTRAMUSCULAR; INTRAVENOUS ONCE
Status: COMPLETED | OUTPATIENT
Start: 2020-07-11 | End: 2020-07-11

## 2020-07-11 RX ORDER — DEXMEDETOMIDINE HYDROCHLORIDE 4 UG/ML
.1-1.2 INJECTION, SOLUTION INTRAVENOUS
Status: DISCONTINUED | OUTPATIENT
Start: 2020-07-11 | End: 2020-07-13

## 2020-07-11 RX ORDER — ALBUTEROL SULFATE 2.5 MG/3ML
2.5 SOLUTION RESPIRATORY (INHALATION) EVERY 4 HOURS PRN
Status: DISCONTINUED | OUTPATIENT
Start: 2020-07-11 | End: 2020-07-17 | Stop reason: ALTCHOICE

## 2020-07-11 RX ORDER — MIDAZOLAM HYDROCHLORIDE 2 MG/2ML
1 INJECTION, SOLUTION INTRAMUSCULAR; INTRAVENOUS EVERY 4 HOURS PRN
Status: DISCONTINUED | OUTPATIENT
Start: 2020-07-11 | End: 2020-07-13

## 2020-07-11 RX ORDER — ALBUTEROL SULFATE 2.5 MG/3ML
2.5 SOLUTION RESPIRATORY (INHALATION) EVERY 6 HOURS
Status: DISCONTINUED | OUTPATIENT
Start: 2020-07-12 | End: 2020-07-12

## 2020-07-11 RX ORDER — ALBUMIN (HUMAN) 12.5 G/50ML
25 SOLUTION INTRAVENOUS ONCE
Status: COMPLETED | OUTPATIENT
Start: 2020-07-11 | End: 2020-07-11

## 2020-07-11 RX ORDER — FENTANYL CITRATE/PF 50 MCG/ML
75 SYRINGE (ML) INJECTION ONCE
Status: COMPLETED | OUTPATIENT
Start: 2020-07-11 | End: 2020-07-11

## 2020-07-11 RX ORDER — FENTANYL CITRATE 50 UG/ML
50 INJECTION, SOLUTION INTRAMUSCULAR; INTRAVENOUS EVERY 2 HOUR PRN
Status: DISCONTINUED | OUTPATIENT
Start: 2020-07-11 | End: 2020-07-14

## 2020-07-11 RX ORDER — ALBUTEROL SULFATE 2.5 MG/3ML
2.5 SOLUTION RESPIRATORY (INHALATION) ONCE
Status: DISCONTINUED | OUTPATIENT
Start: 2020-07-11 | End: 2020-07-12

## 2020-07-11 RX ORDER — SODIUM CHLORIDE, SODIUM GLUCONATE, SODIUM ACETATE, POTASSIUM CHLORIDE, MAGNESIUM CHLORIDE, SODIUM PHOSPHATE, DIBASIC, AND POTASSIUM PHOSPHATE .53; .5; .37; .037; .03; .012; .00082 G/100ML; G/100ML; G/100ML; G/100ML; G/100ML; G/100ML; G/100ML
500 INJECTION, SOLUTION INTRAVENOUS ONCE
Status: COMPLETED | OUTPATIENT
Start: 2020-07-11 | End: 2020-07-11

## 2020-07-11 RX ORDER — SODIUM CHLORIDE, SODIUM GLUCONATE, SODIUM ACETATE, POTASSIUM CHLORIDE, MAGNESIUM CHLORIDE, SODIUM PHOSPHATE, DIBASIC, AND POTASSIUM PHOSPHATE .53; .5; .37; .037; .03; .012; .00082 G/100ML; G/100ML; G/100ML; G/100ML; G/100ML; G/100ML; G/100ML
500 INJECTION, SOLUTION INTRAVENOUS ONCE
Status: COMPLETED | OUTPATIENT
Start: 2020-07-11 | End: 2020-07-12

## 2020-07-11 RX ORDER — SODIUM CHLORIDE 30 MG/ML INHALATION SOLUTION 30 MG/ML
4 SOLUTION INHALANT EVERY 12 HOURS
Status: DISCONTINUED | OUTPATIENT
Start: 2020-07-11 | End: 2020-07-12

## 2020-07-11 RX ORDER — ALBUTEROL SULFATE 2.5 MG/3ML
7.5 SOLUTION RESPIRATORY (INHALATION) ONCE
Status: COMPLETED | OUTPATIENT
Start: 2020-07-11 | End: 2020-07-11

## 2020-07-11 RX ORDER — METHYLPREDNISOLONE SODIUM SUCCINATE 40 MG/ML
20 INJECTION, POWDER, LYOPHILIZED, FOR SOLUTION INTRAMUSCULAR; INTRAVENOUS EVERY 8 HOURS SCHEDULED
Status: DISCONTINUED | OUTPATIENT
Start: 2020-07-12 | End: 2020-07-12

## 2020-07-11 RX ADMIN — ALBUTEROL SULFATE 2.5 MG: 2.5 SOLUTION RESPIRATORY (INHALATION) at 21:29

## 2020-07-11 RX ADMIN — ALBUTEROL SULFATE 2.5 MG: 2.5 SOLUTION RESPIRATORY (INHALATION) at 07:38

## 2020-07-11 RX ADMIN — FENTANYL CITRATE 50 MCG: 50 INJECTION INTRAMUSCULAR; INTRAVENOUS at 20:50

## 2020-07-11 RX ADMIN — DEXMEDETOMIDINE HYDROCHLORIDE 0.5 MCG/KG/HR: 4 INJECTION, SOLUTION INTRAVENOUS at 22:10

## 2020-07-11 RX ADMIN — PIPERACILLIN SODIUM AND TAZOBACTAM SODIUM 3.38 G: 36; 4.5 INJECTION, POWDER, FOR SOLUTION INTRAVENOUS at 07:34

## 2020-07-11 RX ADMIN — RISPERIDONE 2 MG: 1 TABLET ORAL at 16:39

## 2020-07-11 RX ADMIN — PIPERACILLIN SODIUM AND TAZOBACTAM SODIUM 3.38 G: 36; 4.5 INJECTION, POWDER, FOR SOLUTION INTRAVENOUS at 04:38

## 2020-07-11 RX ADMIN — MIDAZOLAM 1 MG: 1 INJECTION INTRAMUSCULAR; INTRAVENOUS at 21:22

## 2020-07-11 RX ADMIN — SODIUM CHLORIDE SOLN NEBU 3% 4 ML: 3 NEBU SOLN at 21:22

## 2020-07-11 RX ADMIN — SENNOSIDES 8.8 MG: 8.8 SYRUP ORAL at 16:39

## 2020-07-11 RX ADMIN — DEXMEDETOMIDINE HYDROCHLORIDE 0.7 MCG/KG/HR: 4 INJECTION, SOLUTION INTRAVENOUS at 03:16

## 2020-07-11 RX ADMIN — VALPROIC ACID 250 MG: 250 SOLUTION ORAL at 21:27

## 2020-07-11 RX ADMIN — ALBUMIN (HUMAN) 25 G: 0.25 INJECTION, SOLUTION INTRAVENOUS at 23:00

## 2020-07-11 RX ADMIN — VALPROIC ACID 250 MG: 250 SOLUTION ORAL at 09:02

## 2020-07-11 RX ADMIN — SENNOSIDES 8.8 MG: 8.8 SYRUP ORAL at 09:04

## 2020-07-11 RX ADMIN — IPRATROPIUM BROMIDE 0.5 MG: 0.5 SOLUTION RESPIRATORY (INHALATION) at 23:46

## 2020-07-11 RX ADMIN — ALBUTEROL SULFATE 2.5 MG: 2.5 SOLUTION RESPIRATORY (INHALATION) at 14:22

## 2020-07-11 RX ADMIN — ALBUTEROL SULFATE 2.5 MG: 2.5 SOLUTION RESPIRATORY (INHALATION) at 19:57

## 2020-07-11 RX ADMIN — BUDESONIDE 0.5 MG: 0.5 INHALANT RESPIRATORY (INHALATION) at 19:57

## 2020-07-11 RX ADMIN — FENTANYL CITRATE 75 MCG: 50 INJECTION INTRAMUSCULAR; INTRAVENOUS at 22:09

## 2020-07-11 RX ADMIN — METHYLPREDNISOLONE SODIUM SUCCINATE 40 MG: 40 INJECTION, POWDER, FOR SOLUTION INTRAMUSCULAR; INTRAVENOUS at 22:09

## 2020-07-11 RX ADMIN — CHLORHEXIDINE GLUCONATE 0.12% ORAL RINSE 15 ML: 1.2 LIQUID ORAL at 21:27

## 2020-07-11 RX ADMIN — FENTANYL CITRATE 50 MCG: 50 INJECTION INTRAMUSCULAR; INTRAVENOUS at 16:24

## 2020-07-11 RX ADMIN — ALBUTEROL SULFATE 7.5 MG: 2.5 SOLUTION RESPIRATORY (INHALATION) at 23:46

## 2020-07-11 RX ADMIN — BUDESONIDE 0.5 MG: 0.5 INHALANT RESPIRATORY (INHALATION) at 07:38

## 2020-07-11 RX ADMIN — RISPERIDONE 2 MG: 1 TABLET ORAL at 09:04

## 2020-07-11 RX ADMIN — LEVOTHYROXINE SODIUM 75 MCG: 75 TABLET ORAL at 05:55

## 2020-07-11 RX ADMIN — ATORVASTATIN CALCIUM 10 MG: 10 TABLET, FILM COATED ORAL at 16:39

## 2020-07-11 RX ADMIN — CHLORHEXIDINE GLUCONATE 0.12% ORAL RINSE 15 ML: 1.2 LIQUID ORAL at 09:02

## 2020-07-11 RX ADMIN — ENOXAPARIN SODIUM 40 MG: 40 INJECTION SUBCUTANEOUS at 09:02

## 2020-07-11 RX ADMIN — PIPERACILLIN SODIUM AND TAZOBACTAM SODIUM 3.38 G: 36; 4.5 INJECTION, POWDER, FOR SOLUTION INTRAVENOUS at 16:33

## 2020-07-11 RX ADMIN — SODIUM CHLORIDE, SODIUM GLUCONATE, SODIUM ACETATE, POTASSIUM CHLORIDE, MAGNESIUM CHLORIDE, SODIUM PHOSPHATE, DIBASIC, AND POTASSIUM PHOSPHATE 500 ML: .53; .5; .37; .037; .03; .012; .00082 INJECTION, SOLUTION INTRAVENOUS at 23:41

## 2020-07-11 RX ADMIN — SODIUM CHLORIDE, SODIUM GLUCONATE, SODIUM ACETATE, POTASSIUM CHLORIDE, MAGNESIUM CHLORIDE, SODIUM PHOSPHATE, DIBASIC, AND POTASSIUM PHOSPHATE 500 ML: .53; .5; .37; .037; .03; .012; .00082 INJECTION, SOLUTION INTRAVENOUS at 17:52

## 2020-07-11 RX ADMIN — SODIUM CHLORIDE, SODIUM GLUCONATE, SODIUM ACETATE, POTASSIUM CHLORIDE, MAGNESIUM CHLORIDE, SODIUM PHOSPHATE, DIBASIC, AND POTASSIUM PHOSPHATE 500 ML: .53; .5; .37; .037; .03; .012; .00082 INJECTION, SOLUTION INTRAVENOUS at 11:18

## 2020-07-11 NOTE — PROGRESS NOTES
Progress Note - Eugenie Acuña 1960, 64 y o  female MRN: [id number]    Unit/Bed#: ICU 07 Encounter: [id number]    Primary Care Provider: No primary care provider on file  Date and time admitted to hospital: 7/10/2020 11:18 AM        * Acute on chronic respiratory failure with hypercapnia [facility])  Assessment & Plan  Patient intubated prior to dental cleaning  Extubated post procedure but had increased work of breathing and therefore re-intubated  Likely secondary to hx of pulmonary fibrosis with possible aspiration pneumonia/pneumonitis  Patient is known to be on 1 L O2 at baseline  Patient has pulmonary fibrosis with suspected right middle lobe aspiration pneumonia on CT scan  ABG on arrival shows mild respiratory acidosis with pH of 7 338 and pCO2 of 64  Likely component of chronic hypercapnia   · Continue with current vent settings AC/VC 16/325/50/5  · continue with Zosyn, currently day 2  · Propofol transitioned to precedex overnight, will wean to off  · SAT with SBT  · continue with home Pulmicort    Aspiration pneumonia ([facility] )  Assessment & Plan  · ? Aspiration during dental procedure  · Gastric distention noted on admission CT scan which could have contributed to possible aspiration  · Continue zosyn, day 2  · WBC remains normal  · Sputum culture pending, preliminarily with no bacteria    COPD [facility]chronic obstructive [facility] disease) ([facility] )  Assessment & Plan  · Patient said to be on 1 L oxygen at baseline    · Not in exacerbation   · Continue with home Pulmicort  · Nebs scheduled TID    Disease of thyroid gland  Assessment & Plan  · continue with home levothyroxine 75 mcg    Hypertension  Assessment & Plan  · Patient's BP stable on arrival  · Hold home norvasc for now given soft BP   · Resume when able     Hyperlipidemia  Assessment & Plan  · Continue home Lipitor 10 mg per NG tube    Schizo affective schizophrenia ([facility] )  Assessment & Plan  · Continue with risperidone  · Supportive care    Intellectual disability  Assessment & Plan  · Chronic; nature of the disability not clear  · Patient verbal and ambulatory at baseline    ----------------------------------------------------------------------------------------  HPI/24hr events: Admitted yesterday secondary to failure to extubate post dental cleaning  CT chest with left greater than right upper lobe consolidation  Propofol transitioned to precedex overnight  Patient remained on AC vent settings overnight, transitioned to PS this morning  Hemodynamically stable  Disposition: Continue Critical Care   Code Status: Level 1 - Full Code  ---------------------------------------------------------------------------------------  SUBJECTIVE      Review of Systems  Review of systems was unable to be performed secondary to intubated/sedated   ---------------------------------------------------------------------------------------  OBJECTIVE    Vitals   Vitals:    20 1300 20 1400 20 1423 20 1500   BP: 97/52 (!) 88/55  (!) 82/49   BP Location:       Pulse: 60 60  71   Resp: 20 18  (!) 23   Temp:       TempSrc:       SpO2: 99% 99% 100% 99%   Weight:       Height:         Temp (24hrs), Av 6 °F (35 9 °C), Min:95 9 °F (35 5 °C), Max:97 2 °F (36 2 °C)  Current: Temperature: (!) 97 2 °F (36 2 °C)          Respiratory:  SpO2: SpO2: 99 %       Invasive/non-invasive ventilation settings   Respiratory    Lab Data (Last 4 hours)    None         O2/Vent Data (Last 4 hours)       1145  1423         Vent Mode CPAP/PS Spont CPAP/PS Spont      FIO2 (%) (%) 40 40      PEEP (cmH2O) (cmH2O) 5 5      Pressure Support (cmH2O) (cmH20) 10 5      MV (Obs) 4 87 8 16      RSBI 76                   Physical Exam   Constitutional: She appears well-developed and well-nourished  No distress  She is sedated and intubated  HENT:   Head: Normocephalic and atraumatic  Eyes: Pupils are equal, round, and reactive to light   EOM are normal  No scleral icterus  Neck: Normal range of motion  No JVD present  Cardiovascular: Normal rate, regular rhythm, normal heart sounds and intact distal pulses  Pulmonary/Chest: Effort normal  No accessory muscle usage  She is intubated  No respiratory distress  She has decreased breath sounds  She has no wheezes  She has no rhonchi  She has no rales  Abdominal: Soft  Bowel sounds are normal  She exhibits no distension  There is no tenderness  Genitourinary:   Genitourinary Comments: Yellow urine    Musculoskeletal: Normal range of motion  She exhibits no edema  Lymphadenopathy:     She has no cervical adenopathy  Neurological:   Sedated  Unresponsive to verbal stimuli  Skin: Skin is warm and dry  Capillary refill takes less than 2 seconds  She is not diaphoretic  No erythema         Laboratory and Diagnostics:  Results from last 7 days   Lab Units 07/11/20  0555 07/10/20  1150   WBC Thousand/uL 4 69 4 55   HEMOGLOBIN g/dL 10 9* 9 9*   HEMATOCRIT % 32 9* 30 6*   PLATELETS Thousands/uL 171 170   NEUTROS PCT % 74 86*   MONOS PCT % 7 2*     Results from last 7 days   Lab Units 07/11/20  0555 07/10/20  1150   SODIUM mmol/L 134* 133*   POTASSIUM mmol/L 4 4 4 3   CHLORIDE mmol/L 100 97*   CO2 mmol/L 29 30   ANION GAP mmol/L 5 6   BUN mg/dL 13 14   CREATININE mg/dL 0 86 0 96   CALCIUM mg/dL 8 6 8 4   GLUCOSE RANDOM mg/dL 126 122   ALT U/L  --  17   AST U/L  --  25   ALK PHOS U/L  --  47   ALBUMIN g/dL  --  2 6*   TOTAL BILIRUBIN mg/dL  --  0 30     Results from last 7 days   Lab Units 07/11/20  0555 07/10/20  1150   MAGNESIUM mg/dL 2 4 1 6   PHOSPHORUS mg/dL 4 8*  --                    ABG:  Results from last 7 days   Lab Units 07/11/20  0545   PH ART  7 335*   PCO2 ART mm Hg 59 0*   PO2 ART mm Hg 94 3   HCO3 ART mmol/L 30 8*   BASE EXC ART mmol/L 3 6   ABG SOURCE  Radial, Right     VBG:  Results from last 7 days   Lab Units 07/11/20  0545   ABG SOURCE  Radial, Right     Results from last 7 days   Lab Units 07/11/20  0555   PROCALCITONIN ng/ml <0 05       Micro  Results from last 7 days   Lab Units 07/10/20  1653   SPUTUM CULTURE  Culture too young- will reincubate   GRAM STAIN RESULT  No Polys or Bacteria seen       EKG: normal sinus rhythm   Imaging: I have personally reviewed pertinent reports  and I have personally reviewed pertinent films in PACS    Intake and Output  I/O       07/09 0701 - 07/10 0700 07/10 0701 - 07/11 0700 07/11 0701 - 07/12 0700    I V  (mL/kg)  674 5 (10 8) 522 2 (8 3)    NG/GT  190 135    IV Piggyback  350     Feedings  180     Total Intake(mL/kg)  1394 5 (22 2) 657 2 (10 5)    Urine (mL/kg/hr)  1150 435 (0 8)    Emesis/NG output  150     Total Output  1300 435    Net  +94 5 +222 2                 Height and Weights   Height: 5' (152 4 cm)  IBW: 45 5 kg  Body mass index is 27 kg/m²  Weight (last 2 days)     Date/Time   Weight    07/11/20 0800   62 7 (138 23)    07/11/20 0555   62 7 (138 23)    07/10/20 1410   62 6 (138 01)    07/10/20 1136   60 4 (133 2)                Nutrition       Diet Orders   (From admission, onward)             Start     Ordered    07/10/20 1929  Diet Enteral/Parenteral; Tube Feeding No Oral Diet; Jevity 1 2 Bob; Continuous; 30; 75; Water; Every 4 hours  Diet effective now     Question Answer Comment   Diet Type Enteral/Parenteral    Enteral/Parenteral Tube Feeding No Oral Diet    Tube Feeding Formula: Jevity 1 2 Bob    Bolus/Cyclic/Continuous Continuous    Tube Feeding Goal Rate (mL/hr): 30    Tube Feeding water flush (mL): 75    Water Flush type: Water    Water flush frequency: Every 4 hours    RD to adjust diet per protocol?  No        07/10/20 1929                  Active Medications  Scheduled Meds:  Current Facility-Administered Medications:  acetaminophen 650 mg Oral Q6H PRN Ghislaine Mcdonnell PA-C    albuterol 2 5 mg Nebulization TID Symone Santiago PA-C    amLODIPine 5 mg Per NG Tube Daily MEME Coleman    atorvastatin 10 mg Per NG Tube Daily With [employer] MEME SLOAN    budesonide 0 5 mg Nebulization BID [employer] VMEME    chlorhexidine 15 mL Swish & Spit Q12H [address] Vera NampaDONNA    enoxaparin 40 mg Subcutaneous Daily MEME Coleman    fentanyl citrate (PF) 25 mcg Intravenous Q1H PRN Vera [place], PA-C    levothyroxine 75 mcg Oral Early Morning MEME Coleman    piperacillin-tazobactam 3 375 g Intravenous Q8H Mariah Cintron DO Last Rate: 3 375 g (07/11/20 0734)   risperiDONE 2 mg Oral BID Symone Santiago PA-C    senna 8 8 mg Per NG Tube BID MEME Coleman    valproic acid 250 mg Oral Q12H [address] Symone Santiago PA-C      Continuous Infusions:     PRN Meds:     acetaminophen 650 mg Q6H PRN   fentanyl citrate (PF) 25 mcg Q1H PRN       Invasive Devices Review  Invasive Devices     Peripheral Intravenous Line            Peripheral IV 07/10/20 Left Antecubital 1 day    Peripheral IV 07/10/20 Right Antecubital 1 day          Drain            NG/OG/Enteral Tube Orogastric 16 Fr Right mouth 1 day    Urethral Catheter 16 Fr  1 day          Airway            ETT  Oral 6 5 mm 1 day                Rationale for remaining devices: intubated/sedated  ---------------------------------------------------------------------------------------  Advance Directive and Living Will:      Power of :    POLST:    ---------------------------------------------------------------------------------------  Care Time Delivered:   No Critical Care time spent       MEME Sr      Portions of the record may have been created with voice recognition software  Occasional wrong word or "sound a like" substitutions may have occurred due to the inherent limitations of voice recognition software    Read the chart carefully and recognize, using context, where substitutions have occurred

## 2020-07-11 NOTE — UTILIZATION REVIEW
Continued Stay Review    Date: 7/11/20                        Current Patient Class: inpatient  Current Level of Care: icu  HPI:60 y o  female initially admitted on 7/10/20  Assessment/Plan:   S/P failure to extubate post dental cleaning  CT chest with left greater than right upper lobe consolidation, ivabt continued  Propofol transitioned to precedex overnight  Patient remained on AC vent settings overnight, transitioned to PS this morning  Hemodynamically stable  Sedated  Unresponsive to verbal stimuli      Pertinent Labs/Diagnostic Results:   Results from last 7 days   Lab Units 07/07/20  1005   SARS-COV-2  Not Detected     Results from last 7 days   Lab Units 07/11/20  0555 07/10/20  1150   WBC Thousand/uL 4 69 4 55   HEMOGLOBIN g/dL 10 9* 9 9*   HEMATOCRIT % 32 9* 30 6*   PLATELETS Thousands/uL 171 170   NEUTROS ABS Thousands/µL 3 42 3 89     Results from last 7 days   Lab Units 07/11/20  0555 07/10/20  1150   SODIUM mmol/L 134* 133*   POTASSIUM mmol/L 4 4 4 3   CHLORIDE mmol/L 100 97*   CO2 mmol/L 29 30   ANION GAP mmol/L 5 6   BUN mg/dL 13 14   CREATININE mg/dL 0 86 0 96   EGFR ml/min/1 73sq m 75 65   CALCIUM mg/dL 8 6 8 4   MAGNESIUM mg/dL 2 4 1 6   PHOSPHORUS mg/dL 4 8*  --      Results from last 7 days   Lab Units 07/10/20  1150   AST U/L 25   ALT U/L 17   ALK PHOS U/L 47   TOTAL PROTEIN g/dL 6 8   ALBUMIN g/dL 2 6*   TOTAL BILIRUBIN mg/dL 0 30     Results from last 7 days   Lab Units 07/11/20  0555 07/10/20  1150   GLUCOSE RANDOM mg/dL 126 122     Results from last 7 days   Lab Units 07/11/20  0545 07/10/20  1543 07/10/20  1129   PH ART  7 335* 7 382 7 338*   PCO2 ART mm Hg 59 0* 54 0* 60 4*   PO2 ART mm Hg 94 3 282 4* 292 4*   HCO3 ART mmol/L 30 8* 31 4* 31 7*   BASE EXC ART mmol/L 3 6 5 1 4 6   O2 CONTENT ART mL/dL 16 3 16 7 16 0   O2 HGB, ARTERIAL % 96 2 99 0* 99 3*   ABG SOURCE  Radial, Right Radial, Right Radial, Left       Results from last 7 days   Lab Units 07/10/20  4538   GRAM STAIN RESULT  No Polys or Bacteria seen     Vital Signs: BP (!) 86/54   Pulse 60   Temp (!) 96 7 °F (35 9 °C) (Tympanic)   Resp 15   Ht 5' (1 524 m)   Wt 62 7 kg (138 lb 3 7 oz)   SpO2 98%   BMI 27 00 kg/m²   Medications:   albuterol 2 5 mg Nebulization TID   amLODIPine 5 mg Per NG Tube Daily   atorvastatin 10 mg Per NG Tube Daily With Dinner   budesonide 0 5 mg Nebulization BID   chlorhexidine 15 mL Swish & Spit Q12H [address]   enoxaparin 40 mg Subcutaneous Daily   levothyroxine 75 mcg Oral Early Morning   piperacillin-tazobactam 3 375 g Intravenous Q8H   risperiDONE 2 mg Oral BID   senna 8 8 mg Per NG Tube BID   valproic acid 250 mg Oral Q12H [address]     Continuous IV Infusions:  dexmedetomidine HCl in sodium chloride 0 9% 0 1-0 7 mcg/kg/hr Intravenous Titrated     PRN Meds:  acetaminophen 650 mg Oral Q6H PRN   fentanyl citrate (PF) 25 mcg Intravenous Q1H PRN     Discharge Plan: tbd  Network Utilization Review Department  [email or web address]  ATTENTION: Please call with any questions or concerns to [phone] and carefully listen to the prompts so that you are directed to the right person  All voicemails are confidential   Farrel Bodily all requests for admission clinical reviews, approved or denied determinations and any other requests to dedicated fax number below belonging to the campus where the patient is receiving treatment   List of dedicated fax numbers for the Facilities:  FACILITY NAME UR FAX NUMBER   ADMISSION DENIALS (Administrative/Medical Necessity) [phone]   [address]  (Maternity/NICU/Pediatrics) [phone]   [facility] [address] [place] [address] [place] [phone]

## 2020-07-11 NOTE — NUTRITION
07/11/20 0800   Recommendations/Interventions   Summary Recommend advancing TF as tolerated to Jevity 1 2 @ 50ml/h with 100ml flush q4h to provide 1440 kcal, 67g protein, 1572ml total free fluid  Nutrition to continue to monitor and adjust TF as warranted

## 2020-07-11 NOTE — ASSESSMENT & PLAN NOTE
· Patient said to be on 1 L oxygen at baseline    · Not in exacerbation   · Continue with home Pulmicort  · Nebs scheduled TID

## 2020-07-11 NOTE — UTILIZATION REVIEW
Initial Clinical Review    Admission: Date/Time/Statement: Admission Orders (From admission, onward)     Ordered        07/10/20 1150  Inpatient Admission  Once                   Orders Placed This Encounter   Procedures    Inpatient Admission     Standing Status:   Standing     Number of Occurrences:   1     Order Specific Question:   Admitting Physician     Answer:   Alejandra Oropeza     Order Specific Question:   Level of Care     Answer:   Critical Care [15]     Order Specific Question:   Estimated length of stay     Answer:   More than 2 Midnights     Order Specific Question:   Certification     Answer:   I certify that inpatient services are medically necessary for this patient for a duration of greater than two midnights  See H&P and MD Progress Notes for additional information about the patient's course of treatment  ED Arrival Information     Expected Arrival Acuity Means of Arrival Escorted By Service Admission Type    - 7[facility]10/2020 11:18 [facility]    Arrival Complaint    Post Op Problem        Chief Complaint   Patient presents with    Post-op Problem     Patient was at [facility] for a dental procedure  She was intubated  She is brought in here after a falied extubation  Patient arrives with Dr Marce Nguyen Anethesiologist  Patient was re-intubated with no medication but given 20mg of Rocuronium and Midazolam 2mg at 11am         Assessment/Plan:   64 yof presents with EMS after a failed extubation  She was at the [employer] for a elective dental procedure, but desaturated after extubation  Pt was reintubated by anesthesiologist, and brought to the ER  Hx MR, pulm fibrosis, O2 dependent (2 ltr) copd, hld, htn, schizoaffective disorder, pacer  Presents unresponive & intubated  Admitted to inpatient status for acute respiratory failure with hypercapnia     ED Triage Vitals   Temperature Pulse Respirations Blood Pressure SpO2   07/10/20 1152 07/10/20 1152 07/10/20 1152 07/10/20 1152 07/10/20 1136   (!) 96 8 °F (36 °C) 80 18 145/80 100 %      Temp Source Heart Rate Source Patient Position - Orthostatic VS BP Location FiO2 (%)   07/10/20 1410 07/10/20 1315 07/10/20 2000 07/10/20 1315 07/10/20 1410   Temporal Monitor Lying Right arm 80      Pain Score       07/10/20 2000       No Pain        Wt Readings from Last 1 Encounters:   07/11/20 62 7 kg (138 lb 3 7 oz)     Additional Vital Signs:   07/10/20 1700    60  16  105/65  80  99 %           07/10/20 1645  95 9 °F (35 5 °C)Abnormal                      07/10/20 1601            100 %           07/10/20 1410  96 4 °F (35 8 °C)Abnormal   72  18  156/74    100 %  80    Ventilator     07/10/20 1315    64  16  130/67  92  100 %      Ventilator     07/10/20 1152  96 8 °F (36 °C)Abnormal   80  18  145/80    99 %           07/10/20 1150                  Ventilator     07/10/20 1136            100 %           Pertinent Labs/Diagnostic Test Results:   Results from last 7 days   Lab Units 07/07/20  1005   SARS-COV-2  Not Detected     Results from last 7 days   Lab Units 07/10/20  1150   WBC Thousand/uL 4 55   HEMOGLOBIN g/dL 9 9*   HEMATOCRIT % 30 6*   PLATELETS Thousands/uL 170   NEUTROS ABS Thousands/µL 3 89     Results from last 7 days   Lab Units 07/10/20  1150   SODIUM mmol/L 133*   POTASSIUM mmol/L 4 3   CHLORIDE mmol/L 97*   CO2 mmol/L 30   ANION GAP mmol/L 6   BUN mg/dL 14   CREATININE mg/dL 0 96   EGFR ml/min/1 73sq m 65   CALCIUM mg/dL 8 4   MAGNESIUM mg/dL 1 6   PHOSPHORUS mg/dL  --      Results from last 7 days   Lab Units 07/10/20  1150   AST U/L 25   ALT U/L 17   ALK PHOS U/L 47   TOTAL PROTEIN g/dL 6 8   ALBUMIN g/dL 2 6*   TOTAL BILIRUBIN mg/dL 0 30     Results from last 7 days   Lab Units 07/10/20  1150   GLUCOSE RANDOM mg/dL 122     Results from last 7 days   Lab Units 07/10/20  1543 07/10/20  1129   PH ART  7 382 7 338*   PCO2 ART mm Hg 54 0* 60 4*   PO2 ART mm Hg 282 4* 292 4*   HCO3 ART mmol/L 31 4* 31 7*   BASE EXC ART mmol/L 5 1 4 6   O2 CONTENT ART mL/dL 16 7 16 0   O2 HGB, ARTERIAL % 99 0* 99 3*   ABG SOURCE  Radial, Right Radial, Left     Results from last 7 days   Lab Units 07/10/20  1653   GRAM STAIN RESULT  No Polys or Bacteria seen     7/10  Cxr=Low endotracheal tube  4 4 cm linear metallic opacity over the superior mediastinum  Examination of the patient is needed to make sure this is outside the body, either on the anterior or posterior chest wall  If there is no metallic object outside the body, consider CT to   evaluate for swallowed versus aspirated foreign body  Ground glass opacity throughout the left lung, question aspiration  Trace right effusion  Ct chest=No swallowed or aspirated foreign body  The 4 mm linear foreign body projecting over the superior mediastinum on the chest radiograph is a capped needle within the bedding posterior to the patient  Lungs suboptimally evaluated due to respiratory motion with honeycombing at the periphery of the left lung due to pulmonary fibrosis  Left greater than right upper lobe consolidation, dependent in the left upper lobe  The dependent location suggest aspiration  Small pleural effusions    Gastric distention    ED Treatment:   Medication Administration from 07/10/2020 1118 to 07/10/2020 1410       Date/Time Order Dose Route Action     07/10/2020 1120 propofol (DIPRIVAN) 200 MG/20ML bolus injection 50 mg 50 mg Intravenous Given     07/10/2020 1312 propofol (DIPRIVAN) 1000 mg in 100 mL infusion (premix) 16 28 mcg/kg/min Intravenous Rate/Dose Change     07/10/2020 1215 propofol (DIPRIVAN) 1000 mg in 100 mL infusion (premix) 10 mcg/kg/min Intravenous New Bag     07/10/2020 1215 propofol (DIPRIVAN) 200 MG/20ML bolus injection 50 mg 50 mg Intravenous Given        Past Medical History:   Diagnosis Date    Bradycardia     COPD (chronic obstructive pulmonary disease) (HCC) uses O2 at 1L every 24 hrs   Disease of thyroid gland     hypo    Hyperlipidemia     Hypertension     Intellectual disability     verbal    Schizo affective schizophrenia ([facility] [address] )      Present on Admission:   Intellectual disability   Schizo affective schizophrenia ([facility] )   COPD (chronic obstructive pulmonary disease) (HCC)   Hyperlipidemia   Hypertension   Disease of thyroid gland   Aspiration pneumonia (HCC)   Acute respiratory failure with hypercapnia (HCC)  Admitting Diagnosis: Post-operative complication [P76  9XXA]  Acute respiratory failure requiring reintubation ([facility] ) [J96 00]  Age/Sex: 64 y o  female  Admission Orders:  Intubated  Cont pulse ox  Fall precautions  Pt/ot eval & tx  Consult nutrition  Neurovascular checks q4h  Neuro checks q2h  Scd/foot pumps  OGT: Diet Enteral/Parenteral; Tube Feeding No Oral Diet; Jevity 1 2 Bob; Continuous; 30; 75; Water; Every 4 hours  Scheduled Medications:  albuterol 2 5 mg Nebulization TID   amLODIPine 5 mg Per NG Tube Daily   atorvastatin 10 mg Per NG Tube Daily With Dinner   budesonide 0 5 mg Nebulization BID   chlorhexidine 15 mL Swish & Spit Q12H [address]   enoxaparin 40 mg Subcutaneous Daily   levothyroxine 75 mcg Oral Early Morning   piperacillin-tazobactam 3 375 g Intravenous Q8H   risperiDONE 2 mg Oral BID   senna 8 8 mg Per NG Tube BID   valproic acid 250 mg Oral Q12H [address]     Continuous IV Infusions:    dexmedetomidine HCl in sodium chloride 0 9% 0 1-0 7 mcg/kg/hr Intravenous Titrated     PRN Meds:  acetaminophen 650 mg Oral Q6H PRN   fentanyl citrate (PF) 25 mcg Intravenous Q1H PRN     Network Utilization Review Department  [email or web address]  ATTENTION: Please call with any questions or concerns to [phone] and carefully listen to the prompts so that you are directed to the right person   All voicemails are confidential   Marvel Do all requests for admission clinical reviews, approved or denied determinations and any other requests to dedicated fax number below belonging to the campus where the patient is receiving treatment   List of dedicated fax numbers for the Facilities:  [address] [place] [address] DENIALS (Administrative/Medical Necessity) [phone]   [address]  (Maternity/NICU/Pediatrics) [phone]   [place] [phone]   [facility] [phone]   [facility] [phone]   [facility] [phone]   [address] [place] [address] [phone]   [facility]  [phone]   [address] [facility]  [address] [facility] [address] [facility] [phone]

## 2020-07-11 NOTE — PROGRESS NOTES
The patients standard-infusion Piperacillin-Tazobactam / Zosyn (infused over 30-60 minutes) has been converted to extended-infusion (infused over 4 hours) per [facility] Extended-Infusion Piperacillin-Tazobactam Protocol for Adults as approved by the Pharmacy and Therapeutics Committee (accessible here on MyNET)       The patient met ALL eligible criteria:    Age >= 25years old   Critical Care patient    And did NOT have ANY exclusions:     Emergency Department or Operating Room patient  Drug incompatibilities that could NOT be avoided with timing or separate line administration    The following are reminders for Nursing regarding administration:  Infuse the first dose of Zosyn over 30min as a load (if new start), and then all subsequent doses will be given as an extended-infusion over 4 hours (see dosing below)  Use primary tubing as an intermittent infusion; change out primary tubing every 24 hours   Ensure full dose of the medication is given at the appropriate rate  Most incompatible drugs can be scheduled during times when the Zosyn is not being infused; however, if one requires administration during the same time, a separate site or lumen MUST be used  If access is limited and an incompatible medication urgently needs to be given, the Zosyn extended-infusion can be held for up to 30min (remember to flush line before/after)  Extended-infusion Zosyn does NOT require special timing around hemodialysis (it can even be given simultaneously)  If a patient needs an urgent MRI while Zosyn is infusing and there is not a MRI-compatible pump available for use, finish the infusion over the traditional length (30min) and ask Pharmacy to reschedule the next doses so that they start a few hours earlier  Pharmacy will assist nursing in troubleshooting other administration issues as they arise  Dosing for Piperacillin-Tazobactam  CrCl (mL/min) Traditional Dosing Extended-Infusion Dosing #   CrCl > 40 High-Dose  CrCl > 40 Low-Dose 4 5g Q6H (over 30min)  3 375g IV Q6H (over 30min) 3 375g IV Q8H (over 4hr)*    *1st dose loaded over 30min, then start extended-infusion dosing 4hr later   CrCl 20-40 High-Dose  CrCl 20-40 Low-Dose 3 375g IV Q6H (over 30min)  2 25g IV Q6H (over 30min)    CrCl < 20 High-Dose  CrCl < 20 Low-Dose 2 25g IV Q6H (over 30min)  2 25g IV Q8H (over 30min) 3 375g IV Q12H (over 4hr)*    *1st dose loaded over 30min, then start extended-infusion dosing 6hr later   Hemo/Peritoneal Dialysis High-Dose  Hemo/Peritoneal Dialysis Low-Dose 2 25g IV Q6H (over 30min)  2 25g IV Q8H (over 30min)    CVVH/D High-Dose  CVVH/D Low-Dose 3 375g IV Q6H (over 30min)  2 25 IV Q6H (over 30min) 3 375g IV Q8H (over 4hr)*    *1st dose loaded over 30min, then start extended-infusion dosing 4hr later   # = Use 4 5g dosing (same interval) if morbidly obese (BMI ?40)    Please call the Pharmacy with any questions or concerns

## 2020-07-11 NOTE — ASSESSMENT & PLAN NOTE
· ? Aspiration during dental procedure  · Gastric distention noted on admission CT scan which could have contributed to possible aspiration  · Continue zosyn, day 2  · WBC remains normal  · Sputum culture pending, preliminarily with no bacteria

## 2020-07-12 PROBLEM — R31.9 HEMATURIA: Status: ACTIVE | Noted: 2020-07-12

## 2020-07-12 PROBLEM — N17.9 AKI (ACUTE KIDNEY INJURY) (HCC): Status: ACTIVE | Noted: 2020-07-12

## 2020-07-12 PROBLEM — J44.1 CHRONIC OBSTRUCTIVE PULMONARY DISEASE WITH ACUTE EXACERBATION (HCC): Status: ACTIVE | Noted: 2020-07-10

## 2020-07-12 PROBLEM — J98.01 BRONCHOSPASM: Status: ACTIVE | Noted: 2020-07-12

## 2020-07-12 LAB
ANION GAP SERPL CALCULATED.3IONS-SCNC: 7 MMOL/L (ref 4–13)
ARTERIAL PATENCY WRIST A: YES
BASE EXCESS BLDA CALC-SCNC: 4.8 MMOL/L
BASOPHILS # BLD AUTO: 0 THOUSANDS/ΜL (ref 0–0.1)
BASOPHILS NFR BLD AUTO: 0 % (ref 0–1)
BODY TEMPERATURE: 99.6 DEGREES FEHRENHEIT
BUN SERPL-MCNC: 16 MG/DL (ref 5–25)
CALCIUM SERPL-MCNC: 8.2 MG/DL (ref 8.3–10.1)
CHLORIDE SERPL-SCNC: 101 MMOL/L (ref 100–108)
CO2 SERPL-SCNC: 31 MMOL/L (ref 21–32)
CREAT SERPL-MCNC: 1.28 MG/DL (ref 0.6–1.3)
CRP SERPL QL: 25.8 MG/L
EOSINOPHIL # BLD AUTO: 0 THOUSAND/ΜL (ref 0–0.61)
EOSINOPHIL NFR BLD AUTO: 0 % (ref 0–6)
ERYTHROCYTE [DISTWIDTH] IN BLOOD BY AUTOMATED COUNT: 12.4 % (ref 11.6–15.1)
GFR SERPL CREATININE-BSD FRML MDRD: 46 ML/MIN/1.73SQ M
GLUCOSE SERPL-MCNC: 118 MG/DL (ref 65–140)
GLUCOSE SERPL-MCNC: 119 MG/DL (ref 65–140)
GLUCOSE SERPL-MCNC: 133 MG/DL (ref 65–140)
GLUCOSE SERPL-MCNC: 136 MG/DL (ref 65–140)
GLUCOSE SERPL-MCNC: 150 MG/DL (ref 65–140)
HCO3 BLDA-SCNC: 32.1 MMOL/L (ref 22–28)
HCT VFR BLD AUTO: 27.1 % (ref 34.8–46.1)
HGB BLD-MCNC: 9.1 G/DL (ref 11.5–15.4)
HOROWITZ INDEX BLDA+IHG-RTO: 40 MM[HG]
IMM GRANULOCYTES # BLD AUTO: 0.02 THOUSAND/UL (ref 0–0.2)
IMM GRANULOCYTES NFR BLD AUTO: 0 % (ref 0–2)
LYMPHOCYTES # BLD AUTO: 0.3 THOUSANDS/ΜL (ref 0.6–4.47)
LYMPHOCYTES NFR BLD AUTO: 4 % (ref 14–44)
MAGNESIUM SERPL-MCNC: 2.2 MG/DL (ref 1.6–2.6)
MCH RBC QN AUTO: 32.2 PG (ref 26.8–34.3)
MCHC RBC AUTO-ENTMCNC: 33.6 G/DL (ref 31.4–37.4)
MCV RBC AUTO: 96 FL (ref 82–98)
MONOCYTES # BLD AUTO: 0.22 THOUSAND/ΜL (ref 0.17–1.22)
MONOCYTES NFR BLD AUTO: 3 % (ref 4–12)
MRSA NOSE QL CULT: NORMAL
NEUTROPHILS # BLD AUTO: 6.62 THOUSANDS/ΜL (ref 1.85–7.62)
NEUTS SEG NFR BLD AUTO: 93 % (ref 43–75)
NRBC BLD AUTO-RTO: 0 /100 WBCS
O2 CT BLDA-SCNC: 14.2 ML/DL (ref 16–23)
OXYHGB MFR BLDA: 96.3 % (ref 94–97)
PCO2 BLDA: 63.3 MM HG (ref 36–44)
PCO2 TEMP ADJ BLDA: 65 MM HG (ref 36–44)
PEEP RESPIRATORY: 5 CM[H2O]
PH BLD: 7.31 [PH] (ref 7.35–7.45)
PH BLDA: 7.32 [PH] (ref 7.35–7.45)
PLATELET # BLD AUTO: 163 THOUSANDS/UL (ref 149–390)
PMV BLD AUTO: 11.1 FL (ref 8.9–12.7)
PO2 BLD: 100.3 MM HG (ref 75–129)
PO2 BLDA: 96.7 MM HG (ref 75–129)
POTASSIUM SERPL-SCNC: 3.6 MMOL/L (ref 3.5–5.3)
RBC # BLD AUTO: 2.83 MILLION/UL (ref 3.81–5.12)
SODIUM SERPL-SCNC: 139 MMOL/L (ref 136–145)
SPECIMEN SOURCE: ABNORMAL
VENT AC: 16
VENT- AC: AC
VT SETTING VENT: 325 ML
WBC # BLD AUTO: 7.16 THOUSAND/UL (ref 4.31–10.16)

## 2020-07-12 PROCEDURE — 85025 COMPLETE CBC W/AUTO DIFF WBC: CPT | Performed by: NURSE PRACTITIONER

## 2020-07-12 PROCEDURE — 80048 BASIC METABOLIC PNL TOTAL CA: CPT | Performed by: NURSE PRACTITIONER

## 2020-07-12 PROCEDURE — 94150 VITAL CAPACITY TEST: CPT

## 2020-07-12 PROCEDURE — 36600 WITHDRAWAL OF ARTERIAL BLOOD: CPT

## 2020-07-12 PROCEDURE — 94640 AIRWAY INHALATION TREATMENT: CPT

## 2020-07-12 PROCEDURE — 82948 REAGENT STRIP/BLOOD GLUCOSE: CPT

## 2020-07-12 PROCEDURE — NC001 PR NO CHARGE: Performed by: INTERNAL MEDICINE

## 2020-07-12 PROCEDURE — 86140 C-REACTIVE PROTEIN: CPT | Performed by: NURSE PRACTITIONER

## 2020-07-12 PROCEDURE — 94760 N-INVAS EAR/PLS OXIMETRY 1: CPT

## 2020-07-12 PROCEDURE — 99291 CRITICAL CARE FIRST HOUR: CPT | Performed by: PHYSICIAN ASSISTANT

## 2020-07-12 PROCEDURE — 83735 ASSAY OF MAGNESIUM: CPT | Performed by: NURSE PRACTITIONER

## 2020-07-12 PROCEDURE — 87205 SMEAR GRAM STAIN: CPT | Performed by: INTERNAL MEDICINE

## 2020-07-12 PROCEDURE — 87070 CULTURE OTHR SPECIMN AEROBIC: CPT | Performed by: INTERNAL MEDICINE

## 2020-07-12 PROCEDURE — 94003 VENT MGMT INPAT SUBQ DAY: CPT

## 2020-07-12 PROCEDURE — 82805 BLOOD GASES W/O2 SATURATION: CPT | Performed by: PHYSICIAN ASSISTANT

## 2020-07-12 RX ORDER — DEXTROSE AND SODIUM CHLORIDE 5; .45 G/100ML; G/100ML
70 INJECTION, SOLUTION INTRAVENOUS CONTINUOUS
Status: DISCONTINUED | OUTPATIENT
Start: 2020-07-12 | End: 2020-07-12

## 2020-07-12 RX ORDER — DIPHENHYDRAMINE HYDROCHLORIDE 50 MG/ML
25 INJECTION INTRAMUSCULAR; INTRAVENOUS EVERY 6 HOURS
Status: DISCONTINUED | OUTPATIENT
Start: 2020-07-12 | End: 2020-07-12

## 2020-07-12 RX ORDER — ROCURONIUM BROMIDE 10 MG/ML
60 INJECTION, SOLUTION INTRAVENOUS ONCE
Status: COMPLETED | OUTPATIENT
Start: 2020-07-12 | End: 2020-07-12

## 2020-07-12 RX ORDER — ALBUTEROL SULFATE 2.5 MG/3ML
2.5 SOLUTION RESPIRATORY (INHALATION) EVERY 4 HOURS
Status: DISCONTINUED | OUTPATIENT
Start: 2020-07-12 | End: 2020-07-13

## 2020-07-12 RX ORDER — MIDAZOLAM HYDROCHLORIDE 2 MG/2ML
2 INJECTION, SOLUTION INTRAMUSCULAR; INTRAVENOUS ONCE
Status: COMPLETED | OUTPATIENT
Start: 2020-07-12 | End: 2020-07-12

## 2020-07-12 RX ORDER — FENTANYL CITRATE-0.9 % NACL/PF 10 MCG/ML
50 PLASTIC BAG, INJECTION (ML) INTRAVENOUS CONTINUOUS
Status: DISCONTINUED | OUTPATIENT
Start: 2020-07-12 | End: 2020-07-13

## 2020-07-12 RX ORDER — METHYLPREDNISOLONE SODIUM SUCCINATE 40 MG/ML
40 INJECTION, POWDER, LYOPHILIZED, FOR SOLUTION INTRAMUSCULAR; INTRAVENOUS EVERY 8 HOURS SCHEDULED
Status: DISCONTINUED | OUTPATIENT
Start: 2020-07-12 | End: 2020-07-13

## 2020-07-12 RX ORDER — PROPOFOL 10 MG/ML
5-50 INJECTION, EMULSION INTRAVENOUS
Status: DISCONTINUED | OUTPATIENT
Start: 2020-07-12 | End: 2020-07-14

## 2020-07-12 RX ORDER — SODIUM CHLORIDE, SODIUM GLUCONATE, SODIUM ACETATE, POTASSIUM CHLORIDE, MAGNESIUM CHLORIDE, SODIUM PHOSPHATE, DIBASIC, AND POTASSIUM PHOSPHATE .53; .5; .37; .037; .03; .012; .00082 G/100ML; G/100ML; G/100ML; G/100ML; G/100ML; G/100ML; G/100ML
75 INJECTION, SOLUTION INTRAVENOUS CONTINUOUS
Status: DISCONTINUED | OUTPATIENT
Start: 2020-07-12 | End: 2020-07-13

## 2020-07-12 RX ADMIN — DIPHENHYDRAMINE HYDROCHLORIDE 25 MG: 50 INJECTION, SOLUTION INTRAMUSCULAR; INTRAVENOUS at 00:24

## 2020-07-12 RX ADMIN — METRONIDAZOLE 500 MG: 500 INJECTION, SOLUTION INTRAVENOUS at 15:34

## 2020-07-12 RX ADMIN — ALBUTEROL SULFATE 2.5 MG: 2.5 SOLUTION RESPIRATORY (INHALATION) at 23:33

## 2020-07-12 RX ADMIN — Medication 2000 MG: at 00:35

## 2020-07-12 RX ADMIN — SENNOSIDES 8.8 MG: 8.8 SYRUP ORAL at 17:42

## 2020-07-12 RX ADMIN — MIDAZOLAM 2 MG: 1 INJECTION INTRAMUSCULAR; INTRAVENOUS at 00:32

## 2020-07-12 RX ADMIN — RISPERIDONE 2 MG: 1 TABLET ORAL at 17:43

## 2020-07-12 RX ADMIN — FAMOTIDINE 20 MG: 10 INJECTION INTRAVENOUS at 09:40

## 2020-07-12 RX ADMIN — ALBUTEROL SULFATE 2.5 MG: 2.5 SOLUTION RESPIRATORY (INHALATION) at 20:21

## 2020-07-12 RX ADMIN — ROCURONIUM BROMIDE 60 MG: 10 INJECTION, SOLUTION INTRAVENOUS at 00:16

## 2020-07-12 RX ADMIN — ALBUTEROL SULFATE 2.5 MG: 2.5 SOLUTION RESPIRATORY (INHALATION) at 01:42

## 2020-07-12 RX ADMIN — VALPROIC ACID 250 MG: 250 SOLUTION ORAL at 21:00

## 2020-07-12 RX ADMIN — METRONIDAZOLE 500 MG: 500 INJECTION, SOLUTION INTRAVENOUS at 08:00

## 2020-07-12 RX ADMIN — PROPOFOL 35 MCG/KG/MIN: 10 INJECTION, EMULSION INTRAVENOUS at 15:30

## 2020-07-12 RX ADMIN — CHLORHEXIDINE GLUCONATE 0.12% ORAL RINSE 15 ML: 1.2 LIQUID ORAL at 09:41

## 2020-07-12 RX ADMIN — METHYLPREDNISOLONE SODIUM SUCCINATE 40 MG: 40 INJECTION, POWDER, FOR SOLUTION INTRAMUSCULAR; INTRAVENOUS at 13:54

## 2020-07-12 RX ADMIN — LEVOTHYROXINE SODIUM 75 MCG: 75 TABLET ORAL at 05:30

## 2020-07-12 RX ADMIN — DEXMEDETOMIDINE HYDROCHLORIDE 1.2 MCG/KG/HR: 4 INJECTION, SOLUTION INTRAVENOUS at 03:05

## 2020-07-12 RX ADMIN — SODIUM CHLORIDE, SODIUM GLUCONATE, SODIUM ACETATE, POTASSIUM CHLORIDE, MAGNESIUM CHLORIDE, SODIUM PHOSPHATE, DIBASIC, AND POTASSIUM PHOSPHATE 75 ML/HR: .53; .5; .37; .037; .03; .012; .00082 INJECTION, SOLUTION INTRAVENOUS at 05:31

## 2020-07-12 RX ADMIN — Medication 2000 MG: at 12:14

## 2020-07-12 RX ADMIN — METRONIDAZOLE 500 MG: 500 INJECTION, SOLUTION INTRAVENOUS at 00:24

## 2020-07-12 RX ADMIN — FENTANYL CITRATE 50 MCG: 50 INJECTION INTRAMUSCULAR; INTRAVENOUS at 07:02

## 2020-07-12 RX ADMIN — ALBUTEROL SULFATE 2.5 MG: 2.5 SOLUTION RESPIRATORY (INHALATION) at 14:40

## 2020-07-12 RX ADMIN — FENTANYL CITRATE 50 MCG: 50 INJECTION INTRAMUSCULAR; INTRAVENOUS at 10:20

## 2020-07-12 RX ADMIN — METHYLPREDNISOLONE SODIUM SUCCINATE 40 MG: 40 INJECTION, POWDER, FOR SOLUTION INTRAMUSCULAR; INTRAVENOUS at 05:30

## 2020-07-12 RX ADMIN — ALBUTEROL SULFATE 2.5 MG: 2.5 SOLUTION RESPIRATORY (INHALATION) at 07:32

## 2020-07-12 RX ADMIN — PROPOFOL 15 MCG/KG/MIN: 10 INJECTION, EMULSION INTRAVENOUS at 03:26

## 2020-07-12 RX ADMIN — SENNOSIDES 8.8 MG: 8.8 SYRUP ORAL at 09:42

## 2020-07-12 RX ADMIN — SODIUM CHLORIDE, SODIUM GLUCONATE, SODIUM ACETATE, POTASSIUM CHLORIDE, MAGNESIUM CHLORIDE, SODIUM PHOSPHATE, DIBASIC, AND POTASSIUM PHOSPHATE 75 ML/HR: .53; .5; .37; .037; .03; .012; .00082 INJECTION, SOLUTION INTRAVENOUS at 09:35

## 2020-07-12 RX ADMIN — CHLORHEXIDINE GLUCONATE 0.12% ORAL RINSE 15 ML: 1.2 LIQUID ORAL at 21:00

## 2020-07-12 RX ADMIN — ALBUTEROL SULFATE 2.5 MG: 2.5 SOLUTION RESPIRATORY (INHALATION) at 10:27

## 2020-07-12 RX ADMIN — Medication 50 MCG/HR: at 15:35

## 2020-07-12 RX ADMIN — METHYLPREDNISOLONE SODIUM SUCCINATE 40 MG: 40 INJECTION, POWDER, FOR SOLUTION INTRAMUSCULAR; INTRAVENOUS at 21:00

## 2020-07-12 RX ADMIN — ATORVASTATIN CALCIUM 10 MG: 10 TABLET, FILM COATED ORAL at 15:34

## 2020-07-12 RX ADMIN — FENTANYL CITRATE 50 MCG: 50 INJECTION INTRAMUSCULAR; INTRAVENOUS at 18:42

## 2020-07-12 RX ADMIN — MIDAZOLAM 1 MG: 1 INJECTION INTRAMUSCULAR; INTRAVENOUS at 20:57

## 2020-07-12 RX ADMIN — VALPROIC ACID 250 MG: 250 SOLUTION ORAL at 09:41

## 2020-07-12 RX ADMIN — RISPERIDONE 2 MG: 1 TABLET ORAL at 09:43

## 2020-07-12 RX ADMIN — DIPHENHYDRAMINE HYDROCHLORIDE 25 MG: 50 INJECTION, SOLUTION INTRAMUSCULAR; INTRAVENOUS at 05:30

## 2020-07-12 NOTE — ASSESSMENT & PLAN NOTE
New onset hematuria  No significant blood loss  No clots - appears to be Clearing  Unclear etiology - urine microscopy with nitrites and bacteria   monitor

## 2020-07-12 NOTE — UTILIZATION REVIEW
Continued Stay Review    Date: 7/12/20                        Current Patient Class: inpatient  Current Level of Care: icu  HPI:60 y o  female initially admitted on 7/10/20 with aspiration pna and respiratory failure following a dental cleaning at an outside facility  Assessment/Plan:   HPI/24hr events: pt with bronchospasm - multiple bronchodilator treatments given without effect  CxR without clear cause  Eventually required paralysis with Rocuronium to maintain ventilator synchrony  Possible component of medication sensitivity - timing suggests possible sensitivity to Zosyn - transitioned to cefepime/flagyl  New hematuria and low urine output - fluid responsive  Continue IVF  Hematuria without clot - clearing  Pulmonary/Chest: Accessory muscle usage present, tachypneic  Remains intubated, in respiratory distress, has wheezes & rhonchi    Pertinent Labs/Diagnostic Results:   Results from last 7 days   Lab Units 07/07/20  1005   SARS-COV-2  Not Detected     Results from last 7 days   Lab Units 07/12/20 0537 07/11/20 2302 07/11/20  0555 07/10/20  1150   WBC Thousand/uL 7 16 5 76 4 69 4 55   HEMOGLOBIN g/dL 9 1* 9 8* 10 9* 9 9*   HEMATOCRIT % 27 1* 29 4* 32 9* 30 6*   PLATELETS Thousands/uL 163 169 171 170   NEUTROS ABS Thousands/µL 6 62 3 94 3 42 3 89     Results from last 7 days   Lab Units 07/12/20 0537 07/11/20 2302 07/11/20  0555 07/10/20  1150   SODIUM mmol/L 139 139 134* 133*   POTASSIUM mmol/L 3 6 3 9 4 4 4 3   CHLORIDE mmol/L 101 101 100 97*   CO2 mmol/L 31 31 29 30   ANION GAP mmol/L 7 7 5 6   BUN mg/dL 16 17 13 14   CREATININE mg/dL 1 28 1 29 0 86 0 96   EGFR ml/min/1 73sq m 46 46 75 65   CALCIUM mg/dL 8 2* 8 6 8 6 8 4   MAGNESIUM mg/dL 2 2 2 1 2 4 1 6   PHOSPHORUS mg/dL  --  3 3 4 8*  --      Results from last 7 days   Lab Units 07/10/20  1150   AST U/L 25   ALT U/L 17   ALK PHOS U/L 47   TOTAL PROTEIN g/dL 6 8   ALBUMIN g/dL 2 6*   TOTAL BILIRUBIN mg/dL 0 30     Results from last 7 days   Lab Units 07/12/20  0602   POC GLUCOSE mg/dl 118     Results from last 7 days   Lab Units 07/12/20  0537 07/11/20  2302 07/11/20  0555 07/10/20  1150   GLUCOSE RANDOM mg/dL 136 117 126 122     Results from last 7 days   Lab Units 07/12/20  0120 07/11/20  2311 07/11/20  0545   PH ART  7 323* 7 325* 7 335*   PCO2 ART mm Hg 63 3* 61 0* 59 0*   PO2 ART mm Hg 96 7 75 7 94 3   HCO3 ART mmol/L 32 1* 31 1* 30 8*   BASE EXC ART mmol/L 4 8 3 9 3 6   O2 CONTENT ART mL/dL 14 2* 13 7* 16 3   O2 HGB, ARTERIAL % 96 3 94 2 96 2   ABG SOURCE  Radial, Right Radial, Right Radial, Right     Results from last 7 days   Lab Units 07/11/20  2302   PH TL  7 504*   PCO2 TL mm Hg 35 9*   PO2 TL mm Hg 131 6*   HCO3 TL mmol/L 27 6   BASE EXC TL mmol/L 4 4   O2 CONTENT TL ml/dL 14 5   O2 HGB, VENOUS % 95 6*     Results from last 7 days   Lab Units 07/11/20  0555   PROCALCITONIN ng/ml <0 05     Results from last 7 days   Lab Units 07/12/20  0537 07/11/20  0555   CRP mg/L 25 8* 6 9*     Results from last 7 days   Lab Units 07/11/20  2335   CLARITY UA  Cloudy   COLOR UA  Dark Juliann   SPEC GRAV UA  >=1 030   PH UA  6 5   GLUCOSE UA mg/dl Negative   KETONES UA mg/dl Trace*   BLOOD UA  Large*   PROTEIN UA mg/dl 100 (2+)*   NITRITE UA  Positive*   BILIRUBIN UA  Negative   UROBILINOGEN UA E U /dl 1 0   LEUKOCYTES UA  Small*   WBC UA /hpf Field obscured, unable to enumerate*   RBC UA /hpf Innumerable*   BACTERIA UA /hpf Innumerable*   EPITHELIAL CELLS WET PREP /hpf None Seen     Results from last 7 days   Lab Units 07/10/20  1653   SPUTUM CULTURE  Culture too young- will reincubate   GRAM STAIN RESULT  No Polys or Bacteria seen     7/12  Cxr=       Vital Signs: /60   Pulse 79   Temp 98 4 °F (36 9 °C) (Tympanic)   Resp 18   Ht 5' (1 524 m)   Wt 62 6 kg (138 lb 0 1 oz)   SpO2 97%   BMI 26 95 kg/m²   Medications:   albuterol 2 5 mg Nebulization Q4H   atorvastatin 10 mg Per NG Tube Daily With Dinner   cefepime 2,000 mg Intravenous Q12H chlorhexidine 15 mL Swish & Spit Q12H [address]   famotidine 20 mg Intravenous Q24H ALEJANDRO   insulin lispro 1-5 Units Subcutaneous Q6H [address]   levothyroxine 75 mcg Oral Early Morning   methylPREDNISolone sodium succinate 40 mg Intravenous Q8H [address]   metroNIDAZOLE 500 mg Intravenous Q8H   risperiDONE 2 mg Oral BID   senna 8 8 mg Per NG Tube BID   valproic acid 250 mg Oral Q12H [address]     Continuous IV Infusions:  dexmedetomidine 0 1-1 2 mcg/kg/hr Intravenous Titrated   multi-electrolyte 75 mL/hr Intravenous Continuous   propofol 5-50 mcg/kg/min Intravenous Titrated     PRN Meds:  acetaminophen 650 mg Oral Q6H PRN   albuterol 2 5 mg Nebulization Q4H PRN   fentanyl citrate (PF) 50 mcg Intravenous Q2H PRN   midazolam 1 mg Intravenous Q4H PRN     Discharge Plan: tbd  Network Utilization Review Department  [email or web address]  ATTENTION: Please call with any questions or concerns to [phone] and carefully listen to the prompts so that you are directed to the right person  All voicemails are confidential   Celia Buchanan all requests for admission clinical reviews, approved or denied determinations and any other requests to dedicated fax number below belonging to the campus where the patient is receiving treatment   List of dedicated fax numbers for the Facilities:  [address] DENIALS (Administrative/Medical Necessity) [phone]   [address] (Maternity/NICU/Pediatrics) [phone]   Dwight Seen [phone]   Katlin Adams [phone]   Delorise Bryan [phone]   [facility] [phone]   [address] [phone]   [facility]  [phone]   [address] [facility] [address]  2401 [facility] [address] [facility] [phone]

## 2020-07-12 NOTE — PROGRESS NOTES
Pt is a 65 yo F - now HD 2 admitted with aspiration pna and respiratory failure following a dental cleaning at an outside facility  · Pt with bronchospasm refractory to bronchodilator therapy  · 1 hr neb treatment given without response  · CXR without pnx or worsening pna  · Pt does have a hx of bronchospastic disease and is on LABA and inhaled corticosteroids as outpatient  · On chart review - pts SOB and tachypnea appeared to occur directly after administration of zosyn  Consider the possibility of drug sensitivities  Will switch pt to cefepime and flagyl for treatment of her PNA and monitor for cross reactivity  · Will start benadryl for two days  · Continue steroids  · Despite maximal treatment - pt remained tachypneic and bronchospastic - getting poor tidal volumes on ventilator  Given rocuronium 60mg X 1 with good response  Pt is sedated on 1 2 of precedex - will give additional versed to ensure good sedation while paralyzed  On ascultation - pt without further bronchospasm  ABG acceptable        Critical care time 35 minutes

## 2020-07-12 NOTE — ASSESSMENT & PLAN NOTE
· Pt with significant bronchospasm  · Unclear etiology - but timing suggests there may be a component of drug sensitivity  · Pt with significant tachypnea and wheezing following zosyn administration  · Continue scheduled breathing treatments  · Continue IV steroids  · Continue Pepcid and benadryl  · Pt required paralytic and sedation to maintain TV and ventilator synchrony  Now on 25 propofol

## 2020-07-12 NOTE — PROGRESS NOTES
Progress Note - Norma [place] 1960, 64 y o  female MRN: [id number]    Unit/Bed#: ICU 07 Encounter: [id number]    Primary Care Provider: No primary care provider on file  Date and time admitted to hospital: 7/10/2020 11:18 AM    Principal Problem:    Acute on chronic respiratory failure with hypercapnia (HCC)  Active Problems:    Aspiration pneumonia (HCC)    Chronic obstructive pulmonary disease with acute exacerbation (HCC)    Hyperlipidemia    Hypertension    Disease of thyroid gland    Bronchospasm    Hematuria    Intellectual disability    Schizo affective schizophrenia ([facility] )      * Acute on chronic respiratory failure with hypercapnia ([facility] )  Assessment & Plan  Patient intubated prior to dental cleaning  Extubated post procedure but had increased work of breathing and therefore re-intubated  Likely secondary to hx of pulmonary fibrosis with possible aspiration pneumonia/pneumonitis  Patient is known to be on 1 L O2 at baseline  Patient has pulmonary fibrosis with suspected right middle lobe aspiration pneumonia on CT scan  ABG on arrival shows mild respiratory acidosis with pH of 7 338 and pCO2 of 64  Likely component of chronic hypercapnia   · Continue with current vent settings AC/VC 16/325/50/5  · Day 3 antibiotics - cefepime/flagyl  · MV day 3 - SBT and sedation vacation daily    Aspiration pneumonia ([facility] )  Assessment & Plan  · ? Aspiration during dental procedure  · Gastric distention noted on admission CT scan which could have contributed to possible aspiration  · Day 3 antibiotics - cefepime/flagyl  · WBC remains normal  · Sputum culture pending, preliminarily with no bacteria    Chronic obstructive pulmonary disease with acute exacerbation ([facility] )  Assessment & Plan  · Patient said to be on 1 L oxygen at baseline    · Now in exacerbation with significant bronchospasm  · bronchspasm could be reactive from lung disease and exacerbated by PNA and MV - possibly reaction from antibiotics? · Continue steroids and scheduled breathing treatments      Bronchospasm  Assessment & Plan  · Pt with significant bronchospasm  · Unclear etiology - but timing suggests there may be a component of drug sensitivity  · Pt with significant tachypnea and wheezing following zosyn administration  · Continue scheduled breathing treatments  · Continue IV steroids  · Continue Pepcid and benadryl  · Pt required paralytic and sedation to maintain TV and ventilator synchrony  Now on 25 propofol  Hematuria  Assessment & Plan  New onset hematuria  No significant blood loss  No clots - appears to be Clearing  Unclear etiology - urine microscopy with nitrites and bacteria   monitor    Schizo affective schizophrenia (HCC)  Assessment & Plan  · Continue with risperidone and valproic acid  · Supportive care    Intellectual disability  Assessment & Plan  · Chronic; nature of the disability not clear  · Patient verbal and ambulatory at baseline    ----------------------------------------------------------------------------------------  HPI/24hr events: pt with bronchospasm - multiple bronchodilator treatments given without effect  CxR without clear cause  Eventually required paralysis with Rocuronium to maintain ventilator synchrony  Possible component of medication sensitivity - timing suggests possible sensitivity to Zosyn - transitioned to cefepime/flagyl  New hematuria and low urine output - fluid responsive  Continue IVF  Hematuria without clot - clearing          Disposition: Continue Critical Care   Code Status: Level 1 - Full Code  ---------------------------------------------------------------------------------------  SUBJECTIVE  None offered      ---------------------------------------------------------------------------------------  OBJECTIVE    Vitals   Vitals:    07/12/20 0142 07/12/20 0200 07/12/20 0300 07/12/20 0400   BP:  119/62 133/67 118/61   Pulse:  (!) 106 95 94   Resp:  (!) 24 20 19   Temp: TempSrc:       SpO2: 97% 97% 97% 99%   Weight:       Height:         Temp (24hrs), Av °F (36 1 °C), Min:96 7 °F (35 9 °C), Max:97 2 °F (36 2 °C)  Current: Temperature: (!) 97 1 °F (36 2 °C)          Respiratory:         Invasive/non-invasive ventilation settings   Respiratory    Lab Data (Last 4 hours)       0120            pH, Arterial       7 323           pCO2, Arterial       63 3           pO2, Arterial       96 7           HCO3, Arterial       32 1           Base Excess, Arterial       4 8                O2/Vent Data        0017   Most Recent         Vent Mode AC/VC  AC/VC      Resp Rate (BPM) (BPM) 16  18      Vt (mL) (mL) 325  325      FIO2 (%) (%) 40  40      PEEP (cmH2O) (cmH2O) 5  5      MV 5 61  6 75      FIO2 (%) (%)   40      PEEP (cmH2O) (cmH2O)   5      Pressure Support (cmH2O) (cmH20)   20                  Physical Exam   Constitutional: She appears well-developed and well-nourished  She appears distressed  She is intubated  HENT:   Head: Normocephalic and atraumatic  Eyes: Pupils are equal, round, and reactive to light  No scleral icterus  Neck: Neck supple  No JVD present  Cardiovascular: Regular rhythm  Tachycardia present  No murmur heard  Pulmonary/Chest: Accessory muscle usage present  Tachypnea noted  She is intubated  She is in respiratory distress  She has wheezes  She has rhonchi  Abdominal: Soft  Bowel sounds are normal  She exhibits no distension  Musculoskeletal: Normal range of motion  She exhibits no edema  Neurological: She is alert  No cranial nerve deficit  Skin: Skin is warm and dry  She is not diaphoretic         Laboratory and Diagnostics:  Results from last 7 days   Lab Units 20  2302 20  0555 07/10/20  1150   WBC Thousand/uL 5 76 4 69 4 55   HEMOGLOBIN g/dL 9 8* 10 9* 9 9*   HEMATOCRIT % 29 4* 32 9* 30 6*   PLATELETS Thousands/uL 169 171 170   NEUTROS PCT % 69 74 86*   MONOS PCT % 16* 7 2*     Results from last 7 days   Lab Units 07/11/20  2302 07/11/20  0555 07/10/20  1150   SODIUM mmol/L 139 134* 133*   POTASSIUM mmol/L 3 9 4 4 4 3   CHLORIDE mmol/L 101 100 97*   CO2 mmol/L 31 29 30   ANION GAP mmol/L 7 5 6   BUN mg/dL 17 13 14   CREATININE mg/dL 1 29 0 86 0 96   CALCIUM mg/dL 8 6 8 6 8 4   GLUCOSE RANDOM mg/dL 117 126 122   ALT U/L  --   --  17   AST U/L  --   --  25   ALK PHOS U/L  --   --  47   ALBUMIN g/dL  --   --  2 6*   TOTAL BILIRUBIN mg/dL  --   --  0 30     Results from last 7 days   Lab Units 07/11/20  2302 07/11/20  0555 07/10/20  1150   MAGNESIUM mg/dL 2 1 2 4 1 6   PHOSPHORUS mg/dL 3 3 4 8*  --                    ABG:  Results from last 7 days   Lab Units 07/12/20  0120   PH ART  7 323*   PCO2 ART mm Hg 63 3*   PO2 ART mm Hg 96 7   HCO3 ART mmol/L 32 1*   BASE EXC ART mmol/L 4 8   ABG SOURCE  Radial, Right     VBG:  Results from last 7 days   Lab Units 07/12/20 0120 07/11/20  2302   PH TL   --   --  7 504*   PCO2 TL mm Hg  --   --  35 9*   PO2 TL mm Hg  --   --  131 6*   HCO3 TL mmol/L  --   --  27 6   BASE EXC TL mmol/L  --   --  4 4   ABG SOURCE  Radial, Right   < >  --     < > = values in this interval not displayed  Results from last 7 days   Lab Units 07/11/20  0555   PROCALCITONIN ng/ml <0 05       Micro  Results from last 7 days   Lab Units 07/10/20  1653   SPUTUM CULTURE  Culture too young- will reincubate   GRAM STAIN RESULT  No Polys or Bacteria seen       EKG: sinus tachycardia on tele  Imaging: I have personally reviewed pertinent reports     and I have personally reviewed pertinent films in PACS    Intake and Output  I/O       07/10 0701 - 07/11 0700 07/11 0701 - 07/12 0700    I V  (mL/kg) 674 5 (10 8) 1736 7 (27 7)    NG/ 285    IV Piggyback 350 300    Feedings 180 180    Total Intake(mL/kg) 1394 5 (22 2) 2501 7 (39 9)    Urine (mL/kg/hr) 1150 680 (0 5)    Emesis/NG output 150     Total Output 1300 680    Net +94 5 +1821 7                Height and Weights   Height: 5' (152 4 cm)  IBW: 45 5 kg  Body mass index is 27 kg/m²  Weight (last 2 days)     Date/Time   Weight    07/11/20 0800   62 7 (138 23)    07/11/20 0555   62 7 (138 23)    07/10/20 1410   62 6 (138 01)    07/10/20 1136   60 4 (133 2)                Nutrition       Diet Orders   (From admission, onward)             Start     Ordered    07/10/20 1929  Diet Enteral/Parenteral; Tube Feeding No Oral Diet; Jevity 1 2 Bob; Continuous; 30; 75; Water; Every 4 hours  Diet effective now     Question Answer Comment   Diet Type Enteral/Parenteral    Enteral/Parenteral Tube Feeding No Oral Diet    Tube Feeding Formula: Jevity 1 2 Bob    Bolus/Cyclic/Continuous Continuous    Tube Feeding Goal Rate (mL/hr): 30    Tube Feeding water flush (mL): 75    Water Flush type: Water    Water flush frequency: Every 4 hours    RD to adjust diet per protocol?  No        07/10/20 1929                  Active Medications  Scheduled Meds:    Current Facility-Administered Medications:  acetaminophen 650 mg Oral Q6H PRN Shanda Mo PA-C    albuterol 2 5 mg Nebulization Q4H PRN Shanda Mo PA-C    albuterol 2 5 mg Nebulization Q6H Symone Santiago PA-C    albuterol 2 5 mg Nebulization Once Symone Santiago PA-C    atorvastatin 10 mg Per NG Tube Daily With Dinner Ever MEME Cheng    budesonide 0 5 mg Nebulization BID MEME Coleman    cefepime 2,000 mg Intravenous Q12H Symone Santiago PA-C Last Rate: 2,000 mg (07/12/20 0035)   chlorhexidine 15 mL Swish [employer] Spit Q12H [address] Shanda Mo PA-C    dexmedetomidine 0 1-1 2 mcg/kg/hr Intravenous Titrated Shanda Mo PA-C Last Rate: Stopped (07/12/20 0329)   diphenhydrAMINE 25 mg Intravenous Q6H Symone Santiago PA-C    famotidine 20 mg Intravenous Q24H [address] Symone Santiago PA-C    fentanyl citrate (PF) 50 mcg Intravenous Q2H PRN MEME Apodaca    levothyroxine 75 mcg Oral Early Morning Blanca Spironello V CRNP    methylPREDNISolone sodium succinate 40 mg Intravenous [facility] Symone Santiago PA-C    metroNIDAZOLE 500 mg Intravenous Q8H Symone Santiago PA-C Last Rate: 500 mg (07/12/20 0024)   midazolam 1 mg Intravenous Q4H PRN Isabella Steiner PA-C    multi-electrolyte 50 mL/hr Intravenous Continuous Isabella Steiner PA-C    propofol 5-50 mcg/kg/min Intravenous Titrated Isabella Steiner PA-C Last Rate: 25 mcg/kg/min (07/12/20 0348)   risperiDONE 2 mg Oral BID Isabella Steiner PA-C    senna 8 8 mg Per NG Tube BID Blanca Spironicko VSAMIRNP    sodium chloride 4 mL Nebulization Q12H Symone Santiago PA-C    valproic acid 250 mg Oral Q12H [address] Symone Santiago PA-C      Continuous Infusions:    dexmedetomidine 0 1-1 2 mcg/kg/hr Last Rate: Stopped (07/12/20 0329)   multi-electrolyte 50 mL/hr    propofol 5-50 mcg/kg/min Last Rate: 25 mcg/kg/min (07/12/20 0348)     PRN Meds:     acetaminophen 650 mg Q6H PRN   albuterol 2 5 mg Q4H PRN   fentanyl citrate (PF) 50 mcg Q2H PRN   midazolam 1 mg Q4H PRN       Invasive Devices Review  Invasive Devices     Peripheral Intravenous Line            Peripheral IV 07/10/20 Left Antecubital 1 day    Peripheral IV 07/10/20 Right Antecubital 1 day          Drain            NG/OG/Enteral Tube Orogastric 16 Fr Right mouth 1 day    Urethral Catheter 16 Fr  1 day          Airway            ETT  Oral 6 5 mm 2 days                ---------------------------------------------------------------------------------------  Advance Directive and Living Will:      Power of :    POLST:    ---------------------------------------------------------------------------------------        Isabella Steiner PA-C      Portions of the record may have been created with voice recognition software  Occasional wrong word or "sound a like" substitutions may have occurred due to the inherent limitations of voice recognition software    Read the chart carefully and recognize, using context, where substitutions have occurred

## 2020-07-12 NOTE — ASSESSMENT & PLAN NOTE
Likely related to acute infection - creatinine rising - now 1 29  Gentle hydration - isolyte 75cc/hr  Follow I/O

## 2020-07-12 NOTE — ASSESSMENT & PLAN NOTE
· ? Aspiration during dental procedure  · Gastric distention noted on admission CT scan which could have contributed to possible aspiration  · Day 3 antibiotics - cefepime/flagyl  · WBC remains normal  · Sputum culture pending, preliminarily with no bacteria

## 2020-07-12 NOTE — ASSESSMENT & PLAN NOTE
· Patient said to be on 1 L oxygen at baseline  · Now in exacerbation with significant bronchospasm  · bronchspasm could be reactive from lung disease and exacerbated by PNA and MV - possibly reaction from antibiotics?   · Continue steroids and scheduled breathing treatments

## 2020-07-12 NOTE — ASSESSMENT & PLAN NOTE
Patient intubated prior to dental cleaning  Extubated post procedure but had increased work of breathing and therefore re-intubated  Likely secondary to hx of pulmonary fibrosis with possible aspiration pneumonia/pneumonitis  Patient is known to be on 1 L O2 at baseline  Patient has pulmonary fibrosis with suspected right middle lobe aspiration pneumonia on CT scan  ABG on arrival shows mild respiratory acidosis with pH of 7 338 and pCO2 of 64   Likely component of chronic hypercapnia   · Continue with current vent settings AC/VC 16/325/50/5  · Day 3 antibiotics - cefepime/flagyl  · MV day 3 - SBT and sedation vacation daily

## 2020-07-12 NOTE — PROGRESS NOTES
Pt is a 63 yo F now HD 2 - admitted with aspiration pna and respiratory failure following a dental cleaning at an outside facility  Pt became tachypneic with high peak pressures  Audible wheezing on exam   Also with copious secretions  Gave additional bronchodilator treatment - some improvement but continues with wheezing, agitation and tachypnea  Prn versed and fentanyl given with transient efficacy     Will increase sedation to facilitate ventilator synchrony  Will start on steroids for bronchospasm refractory to bronchodilator  Will increase bronchodilator frequency  Will add q12H hypertonic saline for secretion management    Critical care time 32 minutes

## 2020-07-13 ENCOUNTER — APPOINTMENT (INPATIENT)
Dept: RADIOLOGY | Facility: HOSPITAL | Age: 60
DRG: 207 | End: 2020-07-13
Payer: MEDICARE

## 2020-07-13 PROBLEM — J45.909 ASTHMA: Status: ACTIVE | Noted: 2019-03-04

## 2020-07-13 PROBLEM — J84.9 INTERSTITIAL LUNG DISEASE (HCC): Status: ACTIVE | Noted: 2020-07-13

## 2020-07-13 PROBLEM — R31.9 HEMATURIA: Status: RESOLVED | Noted: 2020-07-12 | Resolved: 2020-07-13

## 2020-07-13 LAB
ANION GAP SERPL CALCULATED.3IONS-SCNC: 2 MMOL/L (ref 4–13)
BACTERIA SPT RESP CULT: NORMAL
BACTERIA UR CULT: NORMAL
BASOPHILS # BLD AUTO: 0.01 THOUSANDS/ΜL (ref 0–0.1)
BASOPHILS NFR BLD AUTO: 0 % (ref 0–1)
BUN SERPL-MCNC: 15 MG/DL (ref 5–25)
CALCIUM SERPL-MCNC: 8 MG/DL (ref 8.3–10.1)
CHLORIDE SERPL-SCNC: 102 MMOL/L (ref 100–108)
CO2 SERPL-SCNC: 33 MMOL/L (ref 21–32)
CREAT SERPL-MCNC: 0.85 MG/DL (ref 0.6–1.3)
CRP SERPL QL: 28.3 MG/L
EOSINOPHIL # BLD AUTO: 0 THOUSAND/ΜL (ref 0–0.61)
EOSINOPHIL NFR BLD AUTO: 0 % (ref 0–6)
ERYTHROCYTE [DISTWIDTH] IN BLOOD BY AUTOMATED COUNT: 13 % (ref 11.6–15.1)
GFR SERPL CREATININE-BSD FRML MDRD: 76 ML/MIN/1.73SQ M
GLUCOSE SERPL-MCNC: 103 MG/DL (ref 65–140)
GLUCOSE SERPL-MCNC: 114 MG/DL (ref 65–140)
GLUCOSE SERPL-MCNC: 124 MG/DL (ref 65–140)
GLUCOSE SERPL-MCNC: 141 MG/DL (ref 65–140)
GRAM STN SPEC: NORMAL
HCT VFR BLD AUTO: 27.6 % (ref 34.8–46.1)
HGB BLD-MCNC: 9 G/DL (ref 11.5–15.4)
IMM GRANULOCYTES # BLD AUTO: 0.03 THOUSAND/UL (ref 0–0.2)
IMM GRANULOCYTES NFR BLD AUTO: 0 % (ref 0–2)
LYMPHOCYTES # BLD AUTO: 0.57 THOUSANDS/ΜL (ref 0.6–4.47)
LYMPHOCYTES NFR BLD AUTO: 8 % (ref 14–44)
MAGNESIUM SERPL-MCNC: 2.4 MG/DL (ref 1.6–2.6)
MCH RBC QN AUTO: 31.6 PG (ref 26.8–34.3)
MCHC RBC AUTO-ENTMCNC: 32.6 G/DL (ref 31.4–37.4)
MCV RBC AUTO: 97 FL (ref 82–98)
MONOCYTES # BLD AUTO: 0.55 THOUSAND/ΜL (ref 0.17–1.22)
MONOCYTES NFR BLD AUTO: 7 % (ref 4–12)
NEUTROPHILS # BLD AUTO: 6.24 THOUSANDS/ΜL (ref 1.85–7.62)
NEUTS SEG NFR BLD AUTO: 85 % (ref 43–75)
NRBC BLD AUTO-RTO: 0 /100 WBCS
PLATELET # BLD AUTO: 162 THOUSANDS/UL (ref 149–390)
PMV BLD AUTO: 11 FL (ref 8.9–12.7)
POTASSIUM SERPL-SCNC: 4.4 MMOL/L (ref 3.5–5.3)
PROCALCITONIN SERPL-MCNC: <0.05 NG/ML
RBC # BLD AUTO: 2.85 MILLION/UL (ref 3.81–5.12)
SODIUM SERPL-SCNC: 137 MMOL/L (ref 136–145)
WBC # BLD AUTO: 7.4 THOUSAND/UL (ref 4.31–10.16)

## 2020-07-13 PROCEDURE — 94003 VENT MGMT INPAT SUBQ DAY: CPT

## 2020-07-13 PROCEDURE — 86140 C-REACTIVE PROTEIN: CPT | Performed by: INTERNAL MEDICINE

## 2020-07-13 PROCEDURE — 71045 X-RAY EXAM CHEST 1 VIEW: CPT

## 2020-07-13 PROCEDURE — 94150 VITAL CAPACITY TEST: CPT

## 2020-07-13 PROCEDURE — 94640 AIRWAY INHALATION TREATMENT: CPT

## 2020-07-13 PROCEDURE — 97167 OT EVAL HIGH COMPLEX 60 MIN: CPT

## 2020-07-13 PROCEDURE — 94760 N-INVAS EAR/PLS OXIMETRY 1: CPT

## 2020-07-13 PROCEDURE — 82948 REAGENT STRIP/BLOOD GLUCOSE: CPT

## 2020-07-13 PROCEDURE — 85025 COMPLETE CBC W/AUTO DIFF WBC: CPT | Performed by: NURSE PRACTITIONER

## 2020-07-13 PROCEDURE — 97163 PT EVAL HIGH COMPLEX 45 MIN: CPT

## 2020-07-13 PROCEDURE — 84145 PROCALCITONIN (PCT): CPT | Performed by: NURSE PRACTITIONER

## 2020-07-13 PROCEDURE — 83735 ASSAY OF MAGNESIUM: CPT | Performed by: NURSE PRACTITIONER

## 2020-07-13 PROCEDURE — 99291 CRITICAL CARE FIRST HOUR: CPT | Performed by: ANESTHESIOLOGY

## 2020-07-13 PROCEDURE — 93005 ELECTROCARDIOGRAM TRACING: CPT

## 2020-07-13 PROCEDURE — 80048 BASIC METABOLIC PNL TOTAL CA: CPT | Performed by: NURSE PRACTITIONER

## 2020-07-13 RX ORDER — METHYLPREDNISOLONE SODIUM SUCCINATE 40 MG/ML
20 INJECTION, POWDER, LYOPHILIZED, FOR SOLUTION INTRAMUSCULAR; INTRAVENOUS EVERY 8 HOURS SCHEDULED
Status: DISCONTINUED | OUTPATIENT
Start: 2020-07-13 | End: 2020-07-16

## 2020-07-13 RX ORDER — IPRATROPIUM BROMIDE AND ALBUTEROL SULFATE 2.5; .5 MG/3ML; MG/3ML
3 SOLUTION RESPIRATORY (INHALATION)
Status: DISCONTINUED | OUTPATIENT
Start: 2020-07-13 | End: 2020-07-17

## 2020-07-13 RX ADMIN — CHLORHEXIDINE GLUCONATE 0.12% ORAL RINSE 15 ML: 1.2 LIQUID ORAL at 09:01

## 2020-07-13 RX ADMIN — ALBUTEROL SULFATE 2.5 MG: 2.5 SOLUTION RESPIRATORY (INHALATION) at 08:14

## 2020-07-13 RX ADMIN — PROPOFOL 35 MCG/KG/MIN: 10 INJECTION, EMULSION INTRAVENOUS at 12:08

## 2020-07-13 RX ADMIN — Medication 50 MCG/HR: at 07:00

## 2020-07-13 RX ADMIN — PROPOFOL 35 MCG/KG/MIN: 10 INJECTION, EMULSION INTRAVENOUS at 06:11

## 2020-07-13 RX ADMIN — SENNOSIDES 8.8 MG: 8.8 SYRUP ORAL at 18:27

## 2020-07-13 RX ADMIN — Medication 2000 MG: at 00:16

## 2020-07-13 RX ADMIN — MIDAZOLAM 1 MG: 1 INJECTION INTRAMUSCULAR; INTRAVENOUS at 03:39

## 2020-07-13 RX ADMIN — METHYLPREDNISOLONE SODIUM SUCCINATE 20 MG: 40 INJECTION, POWDER, FOR SOLUTION INTRAMUSCULAR; INTRAVENOUS at 15:00

## 2020-07-13 RX ADMIN — SENNOSIDES 8.8 MG: 8.8 SYRUP ORAL at 09:02

## 2020-07-13 RX ADMIN — LEVOTHYROXINE SODIUM 75 MCG: 75 TABLET ORAL at 05:34

## 2020-07-13 RX ADMIN — FENTANYL CITRATE 50 MCG: 50 INJECTION INTRAMUSCULAR; INTRAVENOUS at 16:42

## 2020-07-13 RX ADMIN — METRONIDAZOLE 500 MG: 500 INJECTION, SOLUTION INTRAVENOUS at 09:07

## 2020-07-13 RX ADMIN — FENTANYL CITRATE 50 MCG: 50 INJECTION INTRAMUSCULAR; INTRAVENOUS at 19:54

## 2020-07-13 RX ADMIN — METRONIDAZOLE 500 MG: 500 INJECTION, SOLUTION INTRAVENOUS at 23:42

## 2020-07-13 RX ADMIN — ENOXAPARIN SODIUM 40 MG: 40 INJECTION SUBCUTANEOUS at 09:17

## 2020-07-13 RX ADMIN — METHYLPREDNISOLONE SODIUM SUCCINATE 20 MG: 40 INJECTION, POWDER, FOR SOLUTION INTRAMUSCULAR; INTRAVENOUS at 21:14

## 2020-07-13 RX ADMIN — RISPERIDONE 2 MG: 1 TABLET ORAL at 18:27

## 2020-07-13 RX ADMIN — METRONIDAZOLE 500 MG: 500 INJECTION, SOLUTION INTRAVENOUS at 00:17

## 2020-07-13 RX ADMIN — ATORVASTATIN CALCIUM 10 MG: 10 TABLET, FILM COATED ORAL at 16:30

## 2020-07-13 RX ADMIN — PROPOFOL 20 MCG/KG/MIN: 10 INJECTION, EMULSION INTRAVENOUS at 18:30

## 2020-07-13 RX ADMIN — INSULIN LISPRO 1 UNITS: 100 INJECTION, SOLUTION INTRAVENOUS; SUBCUTANEOUS at 00:18

## 2020-07-13 RX ADMIN — CHLORHEXIDINE GLUCONATE 0.12% ORAL RINSE 15 ML: 1.2 LIQUID ORAL at 21:14

## 2020-07-13 RX ADMIN — RISPERIDONE 2 MG: 1 TABLET ORAL at 09:02

## 2020-07-13 RX ADMIN — IPRATROPIUM BROMIDE AND ALBUTEROL SULFATE 3 ML: 2.5; .5 SOLUTION RESPIRATORY (INHALATION) at 19:37

## 2020-07-13 RX ADMIN — ALBUTEROL SULFATE 2.5 MG: 2.5 SOLUTION RESPIRATORY (INHALATION) at 11:45

## 2020-07-13 RX ADMIN — VALPROIC ACID 250 MG: 250 SOLUTION ORAL at 21:14

## 2020-07-13 RX ADMIN — METHYLPREDNISOLONE SODIUM SUCCINATE 20 MG: 40 INJECTION, POWDER, FOR SOLUTION INTRAMUSCULAR; INTRAVENOUS at 05:36

## 2020-07-13 RX ADMIN — PROPOFOL 35 MCG/KG/MIN: 10 INJECTION, EMULSION INTRAVENOUS at 00:16

## 2020-07-13 RX ADMIN — METRONIDAZOLE 500 MG: 500 INJECTION, SOLUTION INTRAVENOUS at 16:32

## 2020-07-13 RX ADMIN — VALPROIC ACID 250 MG: 250 SOLUTION ORAL at 09:00

## 2020-07-13 RX ADMIN — ALBUTEROL SULFATE 2.5 MG: 2.5 SOLUTION RESPIRATORY (INHALATION) at 03:12

## 2020-07-13 RX ADMIN — Medication 2000 MG: at 13:07

## 2020-07-13 RX ADMIN — FAMOTIDINE 20 MG: 10 INJECTION INTRAVENOUS at 09:01

## 2020-07-13 NOTE — OCCUPATIONAL THERAPY NOTE
OT EVALUATION       07/13/20 0905   Note Type   Note type Eval only   Restrictions/Precautions   Other Precautions Bed Alarm;Cognitive; Fall Risk;Multiple lines  (on vent in ICU)   Pain Assessment   Pain Assessment Tool FLACC   Pain Rating: FLACC (Rest) - Face 0   Pain Rating: FLACC (Rest) - Legs 0   Pain Rating: FLACC (Rest) - Activity 0   Pain Rating: FLACC (Rest) - Cry 0   Pain Rating: FLACC (Rest) - Consolability 0   Score: FLACC (Rest) 0   Home Living   Type of Home Group Home   Additional Comments independent mobility, feeds self per chart   Prior Function   Level of [place] Needs assistance with IADLs   Lives With Facility staff   Receives Help From Personal care attendant   ADL Assistance Needs assistance   IADLs Needs assistance   ADL   Eating Assistance 1  Total Assistance   Grooming Assistance 1  Total Assistance   UB Bathing Assistance 1  Total Assistance   LB Bathing Assistance 1  Total Assistance   UB Dressing Assistance 1  Total Assistance   LB Dressing Assistance 1  Total Assistance   Toileting Assistance  1  Total Assistance   Functional Assistance 1  Total Assistance   Bed Mobility   Additional Comments pt on vent in ICU, will continue to assess functional mobility and transfers  Activity Tolerance   Activity Tolerance Treatment limited secondary to medical complications (Comment)  (on vent in ICU with multiple lines )   Nurse Made Aware yes   RUE Assessment   RUE Assessment WFL  (AAROM WFL)   LUE Assessment   LUE Assessment WFL  (AAR WFL)   Cognition   Overall Cognitive Status Unable to assess   Arousal/Participation Alert   Orientation Level Unable to assess   Following Commands Follows one step commands with increased time or repetition   Comments pt on vent in ICU    Assessment   Limitation Decreased ADL status; Decreased UE strength;Decreased Safe judgement during ADL;Decreased cognition;Decreased endurance;Decreased self-care trans;Decreased high-level ADLs  (decreased balance and mobility)   Prognosis Good   Assessment Patient evaluated by Occupational Therapy  Patient admitted with Acute on chronic respiratory failure with hypercapnia ([facility] )  The patients occupational profile, medical and therapy history includes a extensive additional review of physical, cognitive, or psychosocial history related to current functional performance  Comorbidities affecting functional mobility and ADLS include: hypertension, COPD, schizoaffective schizophrenia, intellectual disability  Prior to admission, patient was independent with functional mobility without assistive device, requiring assist for IADLS and living in a group home  The evaluation identifies the following performance deficits: weakness, decreased ROM, impaired balance, decreased endurance, increased fall risk, new onset of impairment of functional mobility, decreased ADLS, decreased IADLS, decreased activity tolerance, decreased safety awareness, impaired judgement, decreased cognition and decreased strength, that result in activity limitations and/or participation restrictions  This evaluation requires clinical decision making of high complexity, because the patient presents with comorbidites that affect occupational performance and required significant modification of tasks or assistance with consideration of multiple treatment options  The Barthel Index was used as a functional outcome tool presenting with a score of 0, indicating marked limitations of functional mobility and ADLS  Patient will benefit from skilled Occupational Therapy services to address above deficits and facilitate a safe return to prior level of function     Goals   Patient Goals unable to state, on vent    STG Time Frame   (1-7 days)   Short Term Goal  Patient will increase standing tolerance to 1 minutes during ADL task to decrease assistance level and decrease fall risk; Patient will increase bed mobility to mod assist in preparation for ADLS and transfers; Patient will increase functional mobility to and from bathroom with no assistive device with mod assist to increase performance with ADLS and to use a toilet; Patient will tolerate 10 minutes of UE ROM/strengthening to increase general activity tolerance and performance in ADLS/IADLS; Patient will improve functional activity tolerance to 10 minutes of sustained functional tasks to increase participation in basic self-care and decrease assistance level;  Patient will increase dynamic sitting balance to fair to improve the ability to sit at edge of bed or on a chair for ADLS;  Patient will increase static/dynamic standing balance to poor+ to improve postural stability and decrease fall risk during standing ADLS and transfers  LTG Time Frame   (8-14 days)   Long Term Goal Patient will increase standing tolerance to 2 minutes during ADL task to decrease assistance level and decrease fall risk; Patient will increase bed mobility to min assist in preparation for ADLS and transfers; Patient will increase functional mobility to and from bathroom with no assistive device with min assist to increase performance with ADLS and to use a toilet; Patient will tolerate 20 minutes of UE ROM/strengthening to increase general activity tolerance and performance in ADLS/IADLS; Patient will improve functional activity tolerance to 20 minutes of sustained functional tasks to increase participation in basic self-care and decrease assistance level;  Patient will increase dynamic sitting balance to fair+ to improve the ability to sit at edge of bed or on a chair for ADLS;  Patient will increase static/dynamic standing balance to fair- to improve postural stability and decrease fall risk during standing ADLS and transfers       Functional Transfer Goals   Pt Will Perform All Functional Transfers   (STG mod assist LTG min assist )   ADL Goals   Pt Will Perform All ADL's   (STG mod assist LTG Min assist )   Plan   Treatment Interventions ADL retraining;Functional transfer training;UE strengthening/ROM; Endurance training;Patient/family training;Equipment evaluation/education;Cognitive reorientation; Activityengagement; Compensatory technique education   Goal Expiration Date 07/27/20   OT Frequency 3-5x/wk   Recommendation   OT Discharge Recommendation Other (Comment)  (pending progress off vent)   Barthel Index   Feeding 0   Bathing 0   Grooming Score 0   Dressing Score 0   Bladder Score 0   Bowels Score 0   Toilet Use Score 0   Transfers (Bed/Chair) Score 0   Mobility (Level Surface) Score 0   Stairs Score 0   Barthel Index Score 0   Licensure   [place] License Number  Edgardo [address] OTR/L [id number]

## 2020-07-13 NOTE — PHYSICAL THERAPY NOTE
PT EVALUATION     07/13/20 1000   Pain Assessment   Pain Assessment Tool Newby-Baker FACES   Newby-Baker FACES Pain Rating 0   Home Living   Type of Home Group Home   Prior Function   Lives With Facility staff   Receives Help From Personal care attendant   ADL Assistance Needs assistance   IADLs Needs assistance   Comments patient with intellectual disability and resident of group home, as per documentation, patient is able to feed self and walk on her own within supervised living situatiohn   Restrictions/Precautions   Other Precautions Multiple lines; Fall Risk;O2  (in ICU, intubated on vent)   General   Additional Pertinent History chart reviewed, patient admitted with post operative complications following tooth procedure, patient seen intubated in ICU   Family/Caregiver Present No   Cognition   Overall Cognitive Status Unable to assess   Arousal/Participation Lethargic  (minimally responsive, on vent)   Orientation Level Unable to assess   Following Commands Follows one step commands inconsistently   RLE Assessment   RLE Assessment   (ROM WFL, strength limited assessment 2-/5)   LLE Assessment   LLE Assessment   (ROM WFL, strength limited assessment 2-/5)   Coordination   Movements are Fluid and Coordinated 0   Bed Mobility   Supine to Sit Unable to assess   Sit to Supine Unable to assess   Transfers   Sit to Stand Unable to assess   Additional Comments patient intubated on vent in ICU and with limited alertness and command follow at this time   Ambulation/Elevation   Gait Assistance Not tested   Activity Tolerance   Activity Tolerance Treatment limited secondary to medical complications (Comment)  (in ICU on vent, limited responsiveness)   Assessment   Prognosis Good   Problem List Decreased strength;Decreased range of motion;Decreased endurance; Impaired balance;Decreased mobility; Decreased coordination;Decreased cognition; Impaired judgement;Decreased safety awareness   Assessment Patient seen for Physical Therapy evaluation  Patient admitted with Acute on chronic respiratory failure with hypercapnia ([facility] [address] )  Comorbidities affecting patient's physical performance include:intellectual disability COPD, hyperlipidemia , hypertension, thyroid disease , schizoaffective schizophrenia who presents with respiratory distressed after dental procedure  Personal factors affecting patient at time of initial evaluation include: inability to ambulate household distances, inability to navigate community distances, inability to navigate level surfaces without external assistance, inability to perform dynamic tasks in community, inability to perform physical activity, inability to perform ADLS and inability to perform IADLS   Prior to admission, patient was independent with functional mobility without assistive device and living in a group home  Please find objective findings from Physical Therapy assessment regarding body systems outlined above with impairments and limitations including weakness, decreased ROM, impaired balance, decreased endurance, impaired coordination, gait deviations, decreased activity tolerance, decreased functional mobility tolerance, decreased safety awareness, fall risk and SOB upon exertion  The Barthel Index was used as a functional outcome tool presenting with a score of 0 today indicating marked limitations of functional mobility and ADLS  Patient's clinical presentation is currently unstable/unpredictable as seen in patient's presentation of vital sign response, varying levels of cognitive performance, increased fall risk, new onset of impairment of functional mobility, decreased endurance and new onset of weakness  Pt would benefit from continued Physical Therapy treatment to address deficits as defined above and maximize level of functional mobility  As demonstrated by objective findings, the assigned level of complexity for this evaluation is high     Goals   Patient Goals none stated, nonverbal with intubation in ICU   STG Expiration Date 07/20/20   Short Term Goal #1 assess transfers and gait with roller walker mod assist    Short Term Goal #2 gait endurance to 50 feet, strength BLEs 3+/4-   LTG Expiration Date 07/27/20   Long Term Goal #1 transfers and gait with supervision   Long Term Goal #2 gait endurance to functional group home distances   Plan   Treatment/Interventions ADL retraining;Functional transfer training;LE strengthening/ROM; Therapeutic exercise; Endurance training;Cognitive reorientation;Patient/family training;Bed mobility;Gait training; Compensatory technique education   PT Frequency 5x/wk   Recommendation   PT Discharge Recommendation Post-Acute Rehabilitation Services   Barthel Index   Feeding 0   Bathing 0   Grooming Score 0   Dressing Score 0   Bladder Score 0   Bowels Score 0   Toilet Use Score 0   Transfers (Bed/Chair) Score 0   Mobility (Level Surface) Score 0   Stairs Score 0   Barthel Index Score 0   Licensure   [place] License Number  Jose Knott PT [id number]

## 2020-07-13 NOTE — RESPIRATORY THERAPY NOTE
Received patient this a m  On full support, Pt was placed on psv and tolerated well  Pt did not tolerated going back to assist control so she remains at this time on psv 15 40% and peep 5 per md order  BVM at bedside with peep valve and syringe, alarms on and functioning, ETT patent and stable

## 2020-07-13 NOTE — ASSESSMENT & PLAN NOTE
New onset hematuria - now resolved  No significant blood loss  Unclear etiology - urine microscopy with nitrites and bacteria   monitor

## 2020-07-13 NOTE — UTILIZATION REVIEW
Continued Stay Review    Date:   7/13/20                        Current Patient Class: inpatient    Current Level of Care: icu    HPI:60 y o  female initially admitted on      Assessment/Plan: remains in ICU acute on chronic respiratory failure with hypercapnia liekly 2/2 hx of pulmonary fibrosis with possible aspiration pneumonia/pneumonitis  Continue current vent settings day 3 IV Cefepime/Flagyl   COPD bronchospasm improved since abx changes, increased secreations suction,wean IV Solumedrol as tolerates, continue breathing treatments  Hematuria now resolved        Pertinent Labs/Diagnostic Results:   Results from last 7 days   Lab Units 07/07/20  1005   SARS-COV-2  Not Detected     Results from last 7 days   Lab Units 07/13/20  0451 07/12/20  0537 07/11/20  2302 07/11/20  0555 07/10/20  1150   WBC Thousand/uL 7 40 7 16 5 76 4 69 4 55   HEMOGLOBIN g/dL 9 0* 9 1* 9 8* 10 9* 9 9*   HEMATOCRIT % 27 6* 27 1* 29 4* 32 9* 30 6*   PLATELETS Thousands/uL 162 163 169 171 170   NEUTROS ABS Thousands/µL 6 24 6 62 3 94 3 42 3 89     Results from last 7 days   Lab Units 07/13/20  0451 07/12/20  0537 07/11/20  2302 07/11/20  0555 07/10/20  1150   SODIUM mmol/L 137 139 139 134* 133*   POTASSIUM mmol/L 4 4 3 6 3 9 4 4 4 3   CHLORIDE mmol/L 102 101 101 100 97*   CO2 mmol/L 33* 31 31 29 30   ANION GAP mmol/L 2* 7 7 5 6   BUN mg/dL 15 16 17 13 14   CREATININE mg/dL 0 85 1 28 1 29 0 86 0 96   EGFR ml/min/1 73sq m 76 46 46 75 65   CALCIUM mg/dL 8 0* 8 2* 8 6 8 6 8 4   MAGNESIUM mg/dL 2 4 2 2 2 1 2 4 1 6   PHOSPHORUS mg/dL  --   --  3 3 4 8*  --      Results from last 7 days   Lab Units 07/13/20  0451 07/12/20  0537 07/11/20  2302 07/11/20  0555 07/10/20  1150   GLUCOSE RANDOM mg/dL 124 136 117 126 122      Results from last 7 days   Lab Units 07/12/20  0120 07/11/20  2311 07/11/20  0545   PH ART  7 323* 7 325* 7 335*   PCO2 ART mm Hg 63 3* 61 0* 59 0*   PO2 ART mm Hg 96 7 75 7 94 3   HCO3 ART mmol/L 32 1* 31 1* 30 8*   BASE EXC ART mmol/L 4 8 3 9 3 6   O2 CONTENT ART mL/dL 14 2* 13 7* 16 3   O2 HGB, ARTERIAL % 96 3 94 2 96 2   ABG SOURCE  Radial, Right Radial, Right Radial, Right     Results from last 7 days   Lab Units 07/13/20  0451 07/12/20  0537 07/11/20  0555   CRP mg/L 28 3* 25 8* 6 9*     Results from last 7 days   Lab Units 07/12/20  1142 07/10/20  1653   SPUTUM CULTURE  No growth Culture results to follow     GRAM STAIN RESULT  Rare Epithelial cells per low power field*  Rare Polys*  Rare Gram positive cocci in pairs* No Polys or Bacteria seen     Vital Signs:   07/13/20 1100    63  18  120/57  82  97 %         07/13/20 1000    78  20  107/61  80  97 %         07/13/20 0900    80  24Abnormal   116/58  80  96 %         07/13/20 0814            99 %  40        O2 Device: vent at 07/13/20 0814   07/13/20 0800  97 4 °F (36 3 °C)Abnormal   60  18  140/65  90  99 %             icu  Mechanical ventilation  procalcitonin  Urine cx    Medications:   Scheduled Medications:    Medications:  albuterol 2 5 mg Nebulization Q4H   atorvastatin 10 mg Per NG Tube Daily With Dinner   cefepime 2,000 mg Intravenous Q12H   chlorhexidine 15 mL Swish & Spit Q12H ALEJANDRO   enoxaparin 40 mg Subcutaneous Q24H ALEJANDRO   famotidine 20 mg Intravenous Q24H ALEJANDRO   insulin lispro 1-5 Units Subcutaneous Q6H [address]   levothyroxine 75 mcg Oral Early Morning   methylPREDNISolone sodium succinate 20 mg Intravenous Q8H [address]   metroNIDAZOLE 500 mg Intravenous Q8H   risperiDONE 2 mg Oral BID   senna 8 8 mg Per NG Tube BID   valproic acid 250 mg Oral Q12H [address]     Continuous IV Infusions:    dexmedetomidine 0 1-1 2 mcg/kg/hr Intravenous Titrated   propofol 5-50 mcg/kg/min Intravenous Titrated     PRN Meds:    acetaminophen 650 mg Oral Q6H PRN   albuterol 2 5 mg Nebulization Q4H PRN   fentanyl citrate (PF) 50 mcg Intravenous Q2H PRN   midazolam 1 mg Intravenous Q4H PRN       Discharge Plan: tbd    Network Utilization Review [email or web address]  ATTENTION: Please call with any questions or concerns to [phone] and carefully listen to the prompts so that you are directed to the right person  All voicemails are confidential   Brady Nuñez all requests for admission clinical reviews, approved or denied determinations and any other requests to dedicated fax number below belonging to the campus where the patient is receiving treatment   List of dedicated fax numbers for the Facilities:  [address] DENIALS (Administrative/Medical Necessity) [phone]   [address] (Maternity/NICU/Pediatrics) [phone]   Randal Clements [phone]   [facility] [phone]   [facility] [phone]   Ana Rosa Tellez [phone]   [address] [place] [address] [address] [place] [address] [phone]   Mikhail Guajardo [phone]   [address] [facility]  [address] [facility] 1000 [address] [facility] [phone]

## 2020-07-13 NOTE — SOCIAL WORK
LOS - 3 days    CPR2    SW following to monitor needs and assist with planning  Pt is currently in ICU  Case discussed with ICU PA, Jada Riggs  Pt was brought to hospital following a dental procedure at [facility] [address]  during which time she needed to be intubated  Pt remains intubated at this time  Pt is a resident of a group [place] in [place] [address] placed a call to Sumaya Mac, , to assess needs and discuss plans  Discussed goals of making sure pt's needs are met upon discharge and that Freedom of Choice is offered  Per  pt lives in a [facility]  Pt is relatively independent - needs assistance with bathing, independent with eating and ambulating  Uses oxygen 2L/nc continuously-has concentrator and portability  No HHC/STR in past   Pt is intellectually disabled and has diagnosis of schizoaffective disorder/schizophrenia  Per  pt's has a [facility]  named Lamar Cronin, could not recall last name ([phone])  Pt's PCP is Dr Musa Dumont, [place] in [place]  Pt has prescription coverage  [facility] pharmacy [facility]  Pt has no POA or guardian  Per Ms David Hardy pt is her own guardian  Pt does have family members that support her including her brother, Razia Vila, and her niece, Adele Wallace  Discharge plans are pending progress weaning from vent  SW will continue to follow to monitor needs/progress and assist with planning once appropriate level of care is determined

## 2020-07-13 NOTE — ASSESSMENT & PLAN NOTE
· Pt with significant bronchospasm - now improving  · Unclear etiology - but timing suggests there may be a component of drug sensitivity  · Pt with significant tachypnea and wheezing following zosyn administration  · Continue scheduled breathing treatments  · Continue IV steroids  -wean solumedrol  · Continue Pepcid and benadryl

## 2020-07-13 NOTE — ASSESSMENT & PLAN NOTE
· Patient's BP stable on arrival  · Hold home norvasc for now given soft BP - likely 2/2 medication  · Resume when able

## 2020-07-13 NOTE — ASSESSMENT & PLAN NOTE
· Patient said to be on 1 L oxygen at baseline  · Bronchospasm improved  · bronchspasm could be reactive from lung disease and exacerbated by PNA and MV - possibly reaction from antibiotics?   · Wean steroids to Solumedrol 20 Q8

## 2020-07-13 NOTE — PROGRESS NOTES
Progress Note - Mena Zavala 1960, 64 y o  female MRN: [id number]    Unit/Bed#: ICU 07 Encounter: [id number]    Primary Care Provider: No primary care provider on file  Date and time admitted to hospital: 7/10/2020 11:18 AM    Principal Problem:    Acute on chronic respiratory failure with hypercapnia (HCC)  Active Problems:    Aspiration pneumonia (HCC)    Chronic obstructive pulmonary disease with acute exacerbation (HCC)    Hyperlipidemia    Hypertension    Disease of thyroid gland    Bronchospasm    HORTENCIA (acute kidney injury) ([facility] )    Intellectual disability    Schizo affective schizophrenia ([place] [address] )  Resolved Problems:    Hematuria        * Acute on chronic respiratory failure with hypercapnia ([place] [address] )  Assessment & Plan  Patient intubated prior to dental cleaning  Extubated post procedure but had increased work of breathing and therefore re-intubated  Likely secondary to hx of pulmonary fibrosis with possible aspiration pneumonia/pneumonitis  Patient is known to be on 1 L O2 at baseline  Patient has pulmonary fibrosis with suspected right middle lobe aspiration pneumonia on CT scan  ABG on arrival shows mild respiratory acidosis with pH of 7 338 and pCO2 of 64  Likely component of chronic hypercapnia   · Continue with current vent settings AC/VC 16/325/50/5  · Day 3 antibiotics - cefepime/flagyl  · MV day 3 - SBT and sedation vacation daily    Aspiration pneumonia ([place] [address] )  Assessment & Plan  · ? Aspiration during dental procedure  · Gastric distention noted on admission CT scan which could have contributed to possible aspiration  · Day 3 antibiotics - cefepime/flagyl  · WBC remains normal  · Sputum culture pending, preliminarily with no bacteria    Chronic obstructive pulmonary disease with acute exacerbation ([facility] )  Assessment & Plan  · Patient said to be on 1 L oxygen at baseline  · Bronchospasm improved      · bronchspasm could be reactive from lung disease and exacerbated by PNA and MV - possibly reaction from antibiotics? · Wean steroids to Solumedrol 20 Q8      Disease of thyroid gland  Assessment & Plan  · continue with home levothyroxine 75 mcg    Hypertension  Assessment & Plan  · Patient's BP stable on arrival  · Hold home norvasc for now given soft BP - likely 2/2 medication  · Resume when able     Hyperlipidemia  Assessment & Plan  · Continue home Lipitor 10 mg per NG tube    Bronchospasm  Assessment & Plan  · Pt with significant bronchospasm - now improving  · Unclear etiology - but timing suggests there may be a component of drug sensitivity  · Pt with significant tachypnea and wheezing following zosyn administration  · Continue scheduled breathing treatments  · Continue IV steroids  -wean solumedrol  · Continue Pepcid and benadryl      HORTENCIA (acute kidney injury) ([facility] )  Assessment & Plan  Likely related to acute infection - creatinine rising - now 1 29  Gentle hydration - isolyte 75cc/hr  Follow I/O      Hematuriaresolved as of 7/13/2020  Assessment & Plan  New onset hematuria - now resolved  No significant blood loss  Unclear etiology - urine microscopy with nitrites and bacteria   monitor    Schizo affective schizophrenia ([facility] )  Assessment & Plan  · Continue with risperidone and valproic acid  · Supportive care    Intellectual disability  Assessment & Plan  · Chronic; nature of the disability not clear  · Patient verbal and ambulatory at baseline        ----------------------------------------------------------------------------------------  HPI/24hr events: no acute overnight events  Pt with occasional coughing  Remains with increased secretions      Disposition: Continue Critical Care   Code Status: Level 1 - Full Code  ---------------------------------------------------------------------------------------  SUBJECTIVE  None offered      ---------------------------------------------------------------------------------------  OBJECTIVE    Vitals   Vitals:    07/13/20 0100 07/13/20 0200 07/13/20 03020   BP: 105/55 101/54 100/54    Pulse: 76 74 70    Resp: 18 18 18    Temp:       TempSrc:       SpO2: 97% 97% 97% 97%   Weight:       Height:         Temp (24hrs), Av °F (36 7 °C), Min:97 5 °F (36 4 °C), Max:98 7 °F (37 1 °C)  Current: Temperature: 97 8 °F (36 6 °C)          Respiratory:         Invasive/non-invasive ventilation settings   Respiratory    Lab Data (Last 4 hours)    None         O2/Vent Data (Last 4 hours)      312           Vent Mode AC/VC       Resp Rate (BPM) (BPM) 18       Vt (mL) (mL) 325       FIO2 (%) (%) 40       PEEP (cmH2O) (cmH2O) 5       MV 6 27                   Physical Exam    Laboratory and Diagnostics:  Results from last 7 days   Lab Units 07/12/20  0537 07/11/20  2302 07/11/20  0555 07/10/20  1150   WBC Thousand/uL 7 16 5 76 4 69 4 55   HEMOGLOBIN g/dL 9 1* 9 8* 10 9* 9 9*   HEMATOCRIT % 27 1* 29 4* 32 9* 30 6*   PLATELETS Thousands/uL 163 169 171 170   NEUTROS PCT % 93* 69 74 86*   MONOS PCT % 3* 16* 7 2*     Results from last 7 days   Lab Units 07/12/20  0537 07/11/20  2302 07/11/20  0555 07/10/20  1150   SODIUM mmol/L 139 139 134* 133*   POTASSIUM mmol/L 3 6 3 9 4 4 4 3   CHLORIDE mmol/L 101 101 100 97*   CO2 mmol/L 31 31 29 30   ANION GAP mmol/L 7 7 5 6   BUN mg/dL 16 17 13 14   CREATININE mg/dL 1 28 1 29 0 86 0 96   CALCIUM mg/dL 8 2* 8 6 8 6 8 4   GLUCOSE RANDOM mg/dL 136 117 126 122   ALT U/L  --   --   --  17   AST U/L  --   --   --  25   ALK PHOS U/L  --   --   --  47   ALBUMIN g/dL  --   --   --  2 6*   TOTAL BILIRUBIN mg/dL  --   --   --  0 30     Results from last 7 days   Lab Units 20/10/20  1150   MAGNESIUM mg/dL 2 2 2 1 2 4 1 6   PHOSPHORUS mg/dL  --  3 3 4 8*  --                    ABG:  Results from last 7 days   Lab Units 20  0120   PH ART  7 323*   PCO2 ART mm Hg 63 3*   PO2 ART mm Hg 96 7   HCO3 ART mmol/L 32 1*   BASE EXC ART mmol/L 4 8   ABG SOURCE  Radial, Right VBG:  Results from last 7 days   Lab Units 07/12/20  0120  07/11/20  2302   PH TL   --   --  7 504*   PCO2 TL mm Hg  --   --  35 9*   PO2 TL mm Hg  --   --  131 6*   HCO3 TL mmol/L  --   --  27 6   BASE EXC TL mmol/L  --   --  4 4   ABG SOURCE  Radial, Right   < >  --     < > = values in this interval not displayed  Results from last 7 days   Lab Units 07/11/20  0555   PROCALCITONIN ng/ml <0 05       Micro  Results from last 7 days   Lab Units 07/10/20  1653 07/10/20  1436   SPUTUM CULTURE  Culture results to follow  --    GRAM STAIN RESULT  No Polys or Bacteria seen  --    MRSA CULTURE ONLY   --  No Methicillin Resistant Staphlyococcus aureus (MRSA) isolated       EKG: NSR on tele  Imaging:   XR chest portable   Final Result      Stable bilateral opacities suggestive of pneumonia  Small effusions  Workstation performed: [id number]         XR chest portable   Final Result      Persistent bilateral pneumonia  Trace right effusion  Workstation performed: [id number]         CT chest without contrast   Final Result      No swallowed or aspirated foreign body  The 4 mm linear foreign body projecting over the superior mediastinum on the chest radiograph is a capped needle within the bedding posterior to the patient  Lungs suboptimally evaluated due to respiratory motion with honeycombing at the periphery of the left lung due to pulmonary fibrosis  Left greater than right upper lobe consolidation, dependent in the left upper lobe  The dependent location suggest aspiration  Small pleural effusions  Gastric distention  Workstation performed: [id number]         XR chest 1 view portable   ED Interpretation   ETT at toribio  Will pull back 3cm      Final Result      Low endotracheal tube  4 4 cm linear metallic opacity over the superior mediastinum    Examination of the patient is needed to make sure this is outside the body, either on the anterior or posterior chest wall  If there is no metallic object outside the body, consider CT to    evaluate for swallowed versus aspirated foreign body  Ground glass opacity throughout the left lung, question aspiration  Trace right effusion  I personally discussed this study with Ferdinandswapna Jurist on 7/10/2020 at 12: 2 7 PM                Workstation performed: [id number]         XR chest portable    (Results Pending)      I have personally reviewed pertinent reports  and I have personally reviewed pertinent films in PACS    Intake and Output  I/O       07/11 0701 - 07/12 0700 07/12 0701 - 07/13 0700    I V  (mL/kg) 1736 7 (27 7)     NG/ 225    IV Piggyback 300     Feedings 180     Total Intake(mL/kg) 2501 7 (40) 225 (3 6)    Urine (mL/kg/hr) 680 (0 5) 1155 (0 8)    Total Output 680 1155    Net +1821 7 -930                Height and Weights   Height: 5' (152 4 cm)  IBW: 45 5 kg  Body mass index is 26 95 kg/m²  Weight (last 2 days)     Date/Time   Weight    07/12/20 0531   62 6 (138 01)    07/11/20 0800   62 7 (138 23)    07/11/20 0555   62 7 (138 23)                Nutrition       Diet Orders   (From admission, onward)             Start     Ordered    07/10/20 1929  Diet Enteral/Parenteral; Tube Feeding No Oral Diet; Jevity 1 2 Bob; Continuous; 30; 75; Water; Every 4 hours  Diet effective now     Question Answer Comment   Diet Type Enteral/Parenteral    Enteral/Parenteral Tube Feeding No Oral Diet    Tube Feeding Formula: Jevity 1 2 Bob    Bolus/Cyclic/Continuous Continuous    Tube Feeding Goal Rate (mL/hr): 30    Tube Feeding water flush (mL): 75    Water Flush type: Water    Water flush frequency: Every 4 hours    RD to adjust diet per protocol?  No        07/10/20 1929                  Active Medications  Scheduled Meds:  Current Facility-Administered Medications:  acetaminophen 650 mg Oral Q6H PRN Rogers Wilson PA-C    albuterol 2 5 mg Nebulization Q4H PRN Rogers Wilson PA-C albuterol 2 5 mg Nebulization Q4H Symone Santiago PA-C    atorvastatin 10 mg Per NG Tube Daily With Dinner [employer] V, CRNP    cefepime 2,000 mg Intravenous Q12H Symone Santiago PA-C Last Rate: 2,000 mg (07/13/20 0016)   chlorhexidine 15 mL Swish & Spit Q12H [facility] Reinaldo Deleon PA-C    dexmedetomidine 0 1-1 2 mcg/kg/hr Intravenous Titrated Reinaldo Deleon PA-C Last Rate: Stopped (07/12/20 0329)   enoxaparin 40 mg Subcutaneous Q24H [facility] MEME Boykin    famotidine 20 mg Intravenous Q24H [facility] Reinaldo Deleon PA-C    fentaNYL 50 mcg/hr Intravenous Continuous MEME Boykin Last Rate: 50 mcg/hr (07/12/20 2001)   fentanyl citrate (PF) 50 mcg Intravenous Q2H PRN MEME Boykin    insulin lispro 1-5 Units Subcutaneous Q6H [facility] MEME Boykin    levothyroxine 75 mcg Oral Early Morning MEME Coleman    methylPREDNISolone sodium succinate 20 mg Intravenous [facility] Symone Santiago PA-C    metroNIDAZOLE 500 mg Intravenous Q8H Symone Santiago PA-C Last Rate: 500 mg (07/13/20 0017)   midazolam 1 mg Intravenous Q4H PRN Reinaldo Deleon PA-C    multi-electrolyte 75 mL/hr Intravenous Continuous Reinaldo Deleon PA-C Last Rate: 75 mL/hr (07/12/20 2001)   propofol 5-50 mcg/kg/min Intravenous Titrated Reinaldo Deleon PA-C Last Rate: 35 mcg/kg/min (07/13/20 0016)   risperiDONE 2 mg Oral BID Symone Santiago PA-C    senna 8 8 mg Per NG Tube BID MEME Coleman    valproic acid 250 mg Oral Q12H [facility] Symone Santiago PA-C      Continuous Infusions:    dexmedetomidine 0 1-1 2 mcg/kg/hr Last Rate: Stopped (07/12/20 0329)   fentaNYL 50 mcg/hr Last Rate: 50 mcg/hr (07/12/20 2001)   multi-electrolyte 75 mL/hr Last Rate: 75 mL/hr (07/12/20 2001)   propofol 5-50 mcg/kg/min Last Rate: 35 mcg/kg/min (07/13/20 0016)     PRN Meds:     acetaminophen 650 mg Q6H PRN   albuterol 2 5 mg Q4H PRN   fentanyl citrate (PF) 50 mcg Q2H PRN midazolam 1 mg Q4H PRN       Invasive Devices Review  Invasive Devices     Peripheral Intravenous Line            Peripheral IV 07/10/20 Left Antecubital 2 days    Peripheral IV 07/10/20 Right Antecubital 2 days          Drain            NG/OG/Enteral Tube Orogastric 16 Fr Right mouth 2 days    Urethral Catheter 16 Fr  2 days          Airway            ETT  Oral 6 5 mm 3 days                  ---------------------------------------------------------------------------------------  Advance Directive and Living Will:      Power of :    POLST:    ---------------------------------------------------------------------------------------  Care Time Delivered:   No Critical Care time spent       RHODES ILDEFONSO WILLAMS PA-C      Portions of the record may have been created with voice recognition software  Occasional wrong word or "sound a like" substitutions may have occurred due to the inherent limitations of voice recognition software    Read the chart carefully and recognize, using context, where substitutions have occurred

## 2020-07-14 PROBLEM — N17.9 AKI (ACUTE KIDNEY INJURY) (HCC): Status: RESOLVED | Noted: 2020-07-12 | Resolved: 2020-07-14

## 2020-07-14 PROBLEM — J44.1 CHRONIC OBSTRUCTIVE PULMONARY DISEASE WITH ACUTE EXACERBATION (HCC): Status: RESOLVED | Noted: 2020-07-10 | Resolved: 2020-07-14

## 2020-07-14 LAB
ANION GAP SERPL CALCULATED.3IONS-SCNC: 1 MMOL/L (ref 4–13)
BACTERIA SPT RESP CULT: NO GROWTH
BASOPHILS # BLD AUTO: 0.01 THOUSANDS/ΜL (ref 0–0.1)
BASOPHILS NFR BLD AUTO: 0 % (ref 0–1)
BUN SERPL-MCNC: 18 MG/DL (ref 5–25)
CALCIUM SERPL-MCNC: 8.3 MG/DL (ref 8.3–10.1)
CHLORIDE SERPL-SCNC: 103 MMOL/L (ref 100–108)
CO2 SERPL-SCNC: 34 MMOL/L (ref 21–32)
CREAT SERPL-MCNC: 0.9 MG/DL (ref 0.6–1.3)
EOSINOPHIL # BLD AUTO: 0 THOUSAND/ΜL (ref 0–0.61)
EOSINOPHIL NFR BLD AUTO: 0 % (ref 0–6)
ERYTHROCYTE [DISTWIDTH] IN BLOOD BY AUTOMATED COUNT: 13.2 % (ref 11.6–15.1)
GFR SERPL CREATININE-BSD FRML MDRD: 71 ML/MIN/1.73SQ M
GLUCOSE SERPL-MCNC: 127 MG/DL (ref 65–140)
GLUCOSE SERPL-MCNC: 134 MG/DL (ref 65–140)
GLUCOSE SERPL-MCNC: 141 MG/DL (ref 65–140)
GLUCOSE SERPL-MCNC: 148 MG/DL (ref 65–140)
GRAM STN SPEC: ABNORMAL
HCT VFR BLD AUTO: 28.2 % (ref 34.8–46.1)
HGB BLD-MCNC: 9 G/DL (ref 11.5–15.4)
IMM GRANULOCYTES # BLD AUTO: 0.03 THOUSAND/UL (ref 0–0.2)
IMM GRANULOCYTES NFR BLD AUTO: 1 % (ref 0–2)
LYMPHOCYTES # BLD AUTO: 0.6 THOUSANDS/ΜL (ref 0.6–4.47)
LYMPHOCYTES NFR BLD AUTO: 9 % (ref 14–44)
MAGNESIUM SERPL-MCNC: 2.4 MG/DL (ref 1.6–2.6)
MCH RBC QN AUTO: 31.7 PG (ref 26.8–34.3)
MCHC RBC AUTO-ENTMCNC: 31.9 G/DL (ref 31.4–37.4)
MCV RBC AUTO: 99 FL (ref 82–98)
MONOCYTES # BLD AUTO: 0.55 THOUSAND/ΜL (ref 0.17–1.22)
MONOCYTES NFR BLD AUTO: 9 % (ref 4–12)
NEUTROPHILS # BLD AUTO: 5.25 THOUSANDS/ΜL (ref 1.85–7.62)
NEUTS SEG NFR BLD AUTO: 81 % (ref 43–75)
NRBC BLD AUTO-RTO: 0 /100 WBCS
PHOSPHATE SERPL-MCNC: 3.4 MG/DL (ref 2.7–4.5)
PLATELET # BLD AUTO: 189 THOUSANDS/UL (ref 149–390)
PMV BLD AUTO: 11.1 FL (ref 8.9–12.7)
POTASSIUM SERPL-SCNC: 4.6 MMOL/L (ref 3.5–5.3)
RBC # BLD AUTO: 2.84 MILLION/UL (ref 3.81–5.12)
SODIUM SERPL-SCNC: 138 MMOL/L (ref 136–145)
WBC # BLD AUTO: 6.44 THOUSAND/UL (ref 4.31–10.16)

## 2020-07-14 PROCEDURE — 80048 BASIC METABOLIC PNL TOTAL CA: CPT | Performed by: ANESTHESIOLOGY

## 2020-07-14 PROCEDURE — 84100 ASSAY OF PHOSPHORUS: CPT | Performed by: ANESTHESIOLOGY

## 2020-07-14 PROCEDURE — 94150 VITAL CAPACITY TEST: CPT

## 2020-07-14 PROCEDURE — 94640 AIRWAY INHALATION TREATMENT: CPT

## 2020-07-14 PROCEDURE — 94003 VENT MGMT INPAT SUBQ DAY: CPT

## 2020-07-14 PROCEDURE — 99291 CRITICAL CARE FIRST HOUR: CPT | Performed by: ANESTHESIOLOGY

## 2020-07-14 PROCEDURE — 85025 COMPLETE CBC W/AUTO DIFF WBC: CPT | Performed by: ANESTHESIOLOGY

## 2020-07-14 PROCEDURE — 94760 N-INVAS EAR/PLS OXIMETRY 1: CPT

## 2020-07-14 PROCEDURE — 83735 ASSAY OF MAGNESIUM: CPT | Performed by: ANESTHESIOLOGY

## 2020-07-14 PROCEDURE — 82948 REAGENT STRIP/BLOOD GLUCOSE: CPT

## 2020-07-14 RX ORDER — FENTANYL CITRATE 50 UG/ML
25 INJECTION, SOLUTION INTRAMUSCULAR; INTRAVENOUS EVERY 2 HOUR PRN
Status: DISCONTINUED | OUTPATIENT
Start: 2020-07-14 | End: 2020-07-15

## 2020-07-14 RX ORDER — DEXMEDETOMIDINE 100 UG/ML
.1-.7 INJECTION, SOLUTION, CONCENTRATE INTRAVENOUS
Status: DISCONTINUED | OUTPATIENT
Start: 2020-07-14 | End: 2020-07-15

## 2020-07-14 RX ORDER — DEXMEDETOMIDINE HYDROCHLORIDE 4 UG/ML
.1-.7 INJECTION, SOLUTION INTRAVENOUS
Status: DISCONTINUED | OUTPATIENT
Start: 2020-07-14 | End: 2020-07-14 | Stop reason: CLARIF

## 2020-07-14 RX ORDER — FUROSEMIDE 10 MG/ML
20 INJECTION INTRAMUSCULAR; INTRAVENOUS ONCE
Status: COMPLETED | OUTPATIENT
Start: 2020-07-14 | End: 2020-07-14

## 2020-07-14 RX ADMIN — FENTANYL CITRATE 50 MCG: 50 INJECTION INTRAMUSCULAR; INTRAVENOUS at 02:01

## 2020-07-14 RX ADMIN — VALPROIC ACID 250 MG: 250 SOLUTION ORAL at 21:10

## 2020-07-14 RX ADMIN — METRONIDAZOLE 500 MG: 500 INJECTION, SOLUTION INTRAVENOUS at 08:15

## 2020-07-14 RX ADMIN — METHYLPREDNISOLONE SODIUM SUCCINATE 20 MG: 40 INJECTION, POWDER, FOR SOLUTION INTRAMUSCULAR; INTRAVENOUS at 15:00

## 2020-07-14 RX ADMIN — LEVOTHYROXINE SODIUM 75 MCG: 75 TABLET ORAL at 05:41

## 2020-07-14 RX ADMIN — ATORVASTATIN CALCIUM 10 MG: 10 TABLET, FILM COATED ORAL at 15:57

## 2020-07-14 RX ADMIN — CHLORHEXIDINE GLUCONATE 0.12% ORAL RINSE 15 ML: 1.2 LIQUID ORAL at 08:14

## 2020-07-14 RX ADMIN — METRONIDAZOLE 500 MG: 500 INJECTION, SOLUTION INTRAVENOUS at 23:29

## 2020-07-14 RX ADMIN — METHYLPREDNISOLONE SODIUM SUCCINATE 20 MG: 40 INJECTION, POWDER, FOR SOLUTION INTRAMUSCULAR; INTRAVENOUS at 21:21

## 2020-07-14 RX ADMIN — RISPERIDONE 2 MG: 1 TABLET ORAL at 08:17

## 2020-07-14 RX ADMIN — PROPOFOL 30 MCG/KG/MIN: 10 INJECTION, EMULSION INTRAVENOUS at 01:43

## 2020-07-14 RX ADMIN — Medication 2000 MG: at 00:19

## 2020-07-14 RX ADMIN — DEXMEDETOMIDINE HYDROCHLORIDE 0.4 MCG/KG/HR: 4 INJECTION, SOLUTION INTRAVENOUS at 23:03

## 2020-07-14 RX ADMIN — RISPERIDONE 2 MG: 1 TABLET ORAL at 17:47

## 2020-07-14 RX ADMIN — FUROSEMIDE 20 MG: 10 INJECTION, SOLUTION INTRAMUSCULAR; INTRAVENOUS at 12:30

## 2020-07-14 RX ADMIN — FAMOTIDINE 20 MG: 10 INJECTION INTRAVENOUS at 08:14

## 2020-07-14 RX ADMIN — Medication 2000 MG: at 12:30

## 2020-07-14 RX ADMIN — IPRATROPIUM BROMIDE AND ALBUTEROL SULFATE 3 ML: 2.5; .5 SOLUTION RESPIRATORY (INHALATION) at 13:37

## 2020-07-14 RX ADMIN — IPRATROPIUM BROMIDE AND ALBUTEROL SULFATE 3 ML: 2.5; .5 SOLUTION RESPIRATORY (INHALATION) at 01:43

## 2020-07-14 RX ADMIN — IPRATROPIUM BROMIDE AND ALBUTEROL SULFATE 3 ML: 2.5; .5 SOLUTION RESPIRATORY (INHALATION) at 08:10

## 2020-07-14 RX ADMIN — DEXMEDETOMIDINE HYDROCHLORIDE 0.4 MCG/KG/HR: 4 INJECTION, SOLUTION INTRAVENOUS at 06:39

## 2020-07-14 RX ADMIN — ENOXAPARIN SODIUM 40 MG: 40 INJECTION SUBCUTANEOUS at 08:14

## 2020-07-14 RX ADMIN — VALPROIC ACID 250 MG: 250 SOLUTION ORAL at 08:14

## 2020-07-14 RX ADMIN — CHLORHEXIDINE GLUCONATE 0.12% ORAL RINSE 15 ML: 1.2 LIQUID ORAL at 21:10

## 2020-07-14 RX ADMIN — METRONIDAZOLE 500 MG: 500 INJECTION, SOLUTION INTRAVENOUS at 15:57

## 2020-07-14 RX ADMIN — IPRATROPIUM BROMIDE AND ALBUTEROL SULFATE 3 ML: 2.5; .5 SOLUTION RESPIRATORY (INHALATION) at 20:20

## 2020-07-14 RX ADMIN — METHYLPREDNISOLONE SODIUM SUCCINATE 20 MG: 40 INJECTION, POWDER, FOR SOLUTION INTRAMUSCULAR; INTRAVENOUS at 05:41

## 2020-07-14 NOTE — ASSESSMENT & PLAN NOTE
· Patient's BP stable on arrival  · Hold home norvasc for now given soft BP - likely 2/2 sedation   · Resume when able

## 2020-07-14 NOTE — ASSESSMENT & PLAN NOTE
· Patient said to be on 1 L oxygen at baseline  · Patient unclear pulmonary history, no COPD per report but does have interstitial lung disease  · bronchspasm could be reactive from lung disease and exacerbated by PNA and MV - possibly reaction from antibiotics?   · Wean steroids to Solumedrol 20 Q8

## 2020-07-14 NOTE — ASSESSMENT & PLAN NOTE
Patient intubated prior to dental cleaning  Extubated post procedure but had increased work of breathing and therefore re-intubated  Likely secondary to hx of pulmonary fibrosis with possible aspiration pneumonia/pneumonitis  Patient is known to be on 1 L O2 at baseline  Patient has pulmonary fibrosis with suspected right middle lobe aspiration pneumonia on CT scan  ABG on arrival shows mild respiratory acidosis with pH of 7 338 and pCO2 of 64   Likely component of chronic hypercapnia   · Continue with current vent settings AC/VC 16/325/40/5, tolerated PS during the day yesterday  · Day 5 antibiotics - cefepime/flagyl  · MV day 5 - SBT and sedation vacation daily  · Daily SATs with SBTs  · Will switch sedation to precedex to facilitate extubation   · Pulmonary hygiene

## 2020-07-14 NOTE — ASSESSMENT & PLAN NOTE
· ? Aspiration during dental procedure  · Gastric distention noted on admission CT scan which could have contributed to possible aspiration  · Day 5 antibiotics - cefepime/flagyl  · WBC remains normal  · Sputum culture pending, preliminarily with no bacteria  · Repeat sputum culture sent 7/12  · Now with significant secretions

## 2020-07-14 NOTE — ASSESSMENT & PLAN NOTE
· Patient with episodes of high peak pressures overnight on 7/11, ? bronchospasms- now improving  · Unclear etiology - but timing suggests there may be a component of drug sensitivity  · Pt with significant tachypnea and wheezing following zosyn administration, abx changed to cefepime/flagyl   · Continue scheduled breathing treatments  · Continue IV steroids, will continue to wean solumedrol

## 2020-07-14 NOTE — UTILIZATION REVIEW
Continued Stay Review    Date: 7/14/20                       Current Patient Class: Inpatient Current Level of Care: Critical Care    HPI:60 y o  female initially admitted on 7/10/20 with aspiration pneumonia and respiratory failure following a dental cleaning at an outside facility  7/14 Assessment/Plan: Remains intubated  Tolerated PS during the day yesterday  One episode of high peak pressures overnight  Peak pressures at high as 60s,  not improved with suctioning  PRN fentanyl given and propofol briefly titrated up with return of peak pressures to 29  Continues to have significant secretions  Repeat sputum cultures sent 7/12  Daily SATs with SBTs  Current vent settings: AC/VC 16/325/40/5  Sedation changed to Precedex  Day 5 of IV antibiotics  Continue to wean Solu-Medrol  TF @ 50 ml/hour          Pertinent Labs/Diagnostic Results:       Results from last 7 days   Lab Units 07/14/20  0540 07/13/20  0451 07/12/20  0537 07/11/20  2302 07/11/20  0555   WBC Thousand/uL 6 44 7 40 7 16 5 76 4 69   HEMOGLOBIN g/dL 9 0* 9 0* 9 1* 9 8* 10 9*   HEMATOCRIT % 28 2* 27 6* 27 1* 29 4* 32 9*   PLATELETS Thousands/uL 189 162 163 169 171   NEUTROS ABS Thousands/µL 5 25 6 24 6 62 3 94 3 42         Results from last 7 days   Lab Units 07/14/20  0540 07/13/20  0451 07/12/20  0537 07/11/20  2302 07/11/20  0555   SODIUM mmol/L 138 137 139 139 134*   POTASSIUM mmol/L 4 6 4 4 3 6 3 9 4 4   CHLORIDE mmol/L 103 102 101 101 100   CO2 mmol/L 34* 33* 31 31 29   ANION GAP mmol/L 1* 2* 7 7 5   BUN mg/dL 18 15 16 17 13   CREATININE mg/dL 0 90 0 85 1 28 1 29 0 86   EGFR ml/min/1 73sq m 71 76 46 46 75   CALCIUM mg/dL 8 3 8 0* 8 2* 8 6 8 6   MAGNESIUM mg/dL 2 4 2 4 2 2 2 1 2 4   PHOSPHORUS mg/dL 3 4  --   --  3 3 4 8*     Results from last 7 days   Lab Units 07/10/20  1150   AST U/L 25   ALT U/L 17   ALK PHOS U/L 47   TOTAL PROTEIN g/dL 6 8   ALBUMIN g/dL 2 6*   TOTAL BILIRUBIN mg/dL 0 30     Results from last 7 days   Lab Units 07/13/20  2342 07/13/20  1752 07/13/20  1141 07/12/20  2352 07/12/20  1722 07/12/20  1127 07/12/20  0602   POC GLUCOSE mg/dl 141* 103 114 150* 133 119 118     Results from last 7 days   Lab Units 07/14/20  0540 07/13/20  0451 07/12/20  0537 07/11/20  2302 07/11/20  0555 07/10/20  1150   GLUCOSE RANDOM mg/dL 141* 124 136 117 126 122               Results from last 7 days   Lab Units 07/12/20  0120 07/11/20  2311 07/11/20  0545   PH ART  7 323* 7 325* 7 335*   PCO2 ART mm Hg 63 3* 61 0* 59 0*   PO2 ART mm Hg 96 7 75 7 94 3   HCO3 ART mmol/L 32 1* 31 1* 30 8*   BASE EXC ART mmol/L 4 8 3 9 3 6   O2 CONTENT ART mL/dL 14 2* 13 7* 16 3   O2 HGB, ARTERIAL % 96 3 94 2 96 2   ABG SOURCE  Radial, Right Radial, Right Radial, Right     Results from last 7 days   Lab Units 07/11/20  2302   PH TL  7 504*   PCO2 TL mm Hg 35 9*   PO2 TL mm Hg 131 6*   HCO3 TL mmol/L 27 6   BASE EXC TL mmol/L 4 4   O2 CONTENT TL ml/dL 14 5   O2 HGB, VENOUS % 95 6*         Results from last 7 days   Lab Units 07/13/20  0451 07/11/20  0555   PROCALCITONIN ng/ml <0 05 <0 05           Results from last 7 days   Lab Units 07/13/20  0451 07/12/20  0537 07/11/20  0555   CRP mg/L 28 3* 25 8* 6 9*         Results from last 7 days   Lab Units 07/11/20  2335   CLARITY UA  Cloudy   COLOR UA  Dark Juliann   SPEC GRAV UA  >=1 030   PH UA  6 5   GLUCOSE UA mg/dl Negative   KETONES UA mg/dl Trace*   BLOOD UA  Large*   PROTEIN UA mg/dl 100 (2+)*   NITRITE UA  Positive*   BILIRUBIN UA  Negative   UROBILINOGEN UA E U /dl 1 0   LEUKOCYTES UA  Small*   WBC UA /hpf Field obscured, unable to enumerate*   RBC UA /hpf Innumerable*   BACTERIA UA /hpf Innumerable*   EPITHELIAL CELLS WET PREP /hpf None Seen           Results from last 7 days   Lab Units 07/12/20  1142 07/11/20  2335 07/10/20  1653   SPUTUM CULTURE  No growth  --  2+ Growth of    GRAM STAIN RESULT  Rare Epithelial cells per low power field*  Rare Polys*  Rare Gram positive cocci in pairs*  --  No Polys or Bacteria seen   URINE CULTURE   --  No Growth <1000 cfu/mL  --                Vital Signs:     Date/Time  Temp  Pulse  Resp  BP  MAP (mmHg)  SpO2  FiO2 (%)  O2 Flow Rate   O2 Device    07/14/20 0800    62  18  112/64  92  98 %  40    Ventilator    07/14/20 0700    76  19  106/62  77  98 %          07/14/20 0600    79  19      98 %          07/14/20 0500    71  18  121/58  84  98 %          07/14/20 0400  97 8 °F (36 6 °C)  71  18  117/58  83  98 %  40    Ventilator    07/14/20 0338            98 %    40 L/min  Ventilator    07/14/20 0300    82  18  108/53  76  98 %          07/14/20 0221    104  19  95/51  69  96 %          07/14/20 0200    117Abnormal   43Abnormal   103/74  85  94 %          07/14/20 0143            96 %  40    Ventilator    07/14/20 0100    90  27Abnormal   104/57  77  95 %          07/14/20 0000    86  27Abnormal   113/55  78  96 %    40 L/min  Ventilator    07/13/20 2354            96 %    40 L/min  Ventilator    07/13/20 2300  97 9 °F (36 6 °C)  91  38Abnormal   112/56  81  95 %          07/13/20 2200    84  30Abnormal   118/60  83  97 %          07/13/20 2100    92  27Abnormal   107/55  79  97 %          07/13/20 2000    118Abnormal   33Abnormal   150/63  91  95 %  40    Ventilator    07/13/20 1900  96 4 °F (35 8 °C)Abnormal   71  21  135/63  90  97 %          07/13/20 1800    84  27Abnormal   109/57  77  98 %          07/13/20 1700    88  23Abnormal   111/58  79  98 %          07/13/20 1600    75  24Abnormal   121/58  83  96 %  40        07/13/20 1500    85  34Abnormal   118/65  85  98 %          07/13/20 1400    78  18  116/58  81  96 %          07/13/20 1300    74  18  113/56  81  97 %          07/13/20 1200  97 8 °F (36 6 °C)  77  18  111/56  80  100 %   40        07/13/20 1100    63  18  120/57  82  97 %          07/13/20 1000    78  20  107/61  80  97 %          07/13/20 0900    80 24Abnormal   116/58  80  96 %          O2 Device: vent at 07/13/20 0814   07/13/20 0800  97 4 °F (36 3 °C)Abnormal   60  18  140/65  90  99 %                  Medications:   Scheduled Medications:    Medications:  atorvastatin 10 mg Per NG Tube Daily With Dinner   cefepime 2,000 mg Intravenous Q12H   chlorhexidine 15 mL Swish & Spit Q12H [address]   enoxaparin 40 mg Subcutaneous Q24H [address]   famotidine 20 mg Intravenous Q24H ALEJANDRO   insulin lispro 1-5 Units Subcutaneous Q6H [address]   ipratropium-albuterol 3 mL Nebulization Q6H   levothyroxine 75 mcg Oral Early Morning   methylPREDNISolone sodium succinate 20 mg Intravenous Q8H [address]   metroNIDAZOLE 500 mg Intravenous Q8H   risperiDONE 2 mg Oral BID   senna 8 8 mg Per NG Tube BID   valproic acid 250 mg Oral Q12H [address]     Continuous IV Infusions:    dexmedetomidine 0 1-0 7 mcg/kg/hr Intravenous Titrated     PRN Meds:    acetaminophen 650 mg Oral Q6H PRN   albuterol 2 5 mg Nebulization Q4H PRN   fentanyl citrate (PF) 50 mcg Intravenous Q2H PRN       Discharge Plan: TBD          Network Utilization Review Department  [email or web address]  ATTENTION: Please call with any questions or concerns to [phone] and carefully listen to the prompts so that you are directed to the right person  All voicemails are confidential   Jan Delude all requests for admission clinical reviews, approved or denied determinations and any other requests to dedicated fax number below belonging to the campus where the patient is receiving treatment   List of dedicated fax numbers for the Facilities:  FACILITY NAME UR FAX NUMBER   ADMISSION DENIALS (Administrative/Medical Necessity) [phone]   [address] (Maternity/NICU/Pediatrics) [phone]   Zuleyka Rivera [phone]   Evelia Moreira [phone]   [place]  [phone]   [facility] [address] [facility] [phone]   [place] [phone]   [address] [place] [address] [phone]

## 2020-07-14 NOTE — PROGRESS NOTES
Progress Note - Andrew Tenorio 1960, 64 y o  female MRN: [id number]    Unit/Bed#: ICU 07 Encounter: [id number]    Primary Care Provider: No primary care provider on file  Date and time admitted to hospital: 7/10/2020 11:18 AM        * Acute on chronic respiratory failure with hypercapnia [facility])  Assessment & Plan  Patient intubated prior to dental cleaning  Extubated post procedure but had increased work of breathing and therefore re-intubated  Likely secondary to hx of pulmonary fibrosis with possible aspiration pneumonia/pneumonitis  Patient is known to be on 1 L O2 at baseline  Patient has pulmonary fibrosis with suspected right middle lobe aspiration pneumonia on CT scan  ABG on arrival shows mild respiratory acidosis with pH of 7 338 and pCO2 of 64  Likely component of chronic hypercapnia   · Continue with current vent settings AC/VC 16/325/40/5, tolerated PS during the day yesterday  · Day 5 antibiotics - cefepime/flagyl  · MV day 5 - SBT and sedation vacation daily  · Daily SATs with SBTs  · Will switch sedation to precedex to facilitate extubation   · Pulmonary hygiene     Aspiration pneumonia ([address] )  Assessment & Plan  · ? Aspiration during dental procedure  · Gastric distention noted on admission CT scan which could have contributed to possible aspiration  · Day 5 antibiotics - cefepime/flagyl  · WBC remains normal  · Sputum culture pending, preliminarily with no bacteria  · Repeat sputum culture sent 7/12  · Now with significant secretions     Chronic obstructive pulmonary disease with acute exacerbation (HCC)resolved as of 7/14/2020  Assessment & Plan  · Patient said to be on 1 L oxygen at baseline  · Patient unclear pulmonary history, no COPD per report but does have interstitial lung disease  · bronchspasm could be reactive from lung disease and exacerbated by PNA and MV - possibly reaction from antibiotics?   · Wean steroids to Solumedrol 20 Q8      Disease of thyroid gland  Assessment & Plan  · continue with home levothyroxine 75 mcg    Hypertension  Assessment & Plan  · Patient's BP stable on arrival  · Hold home norvasc for now given soft BP - likely 2/2 sedation   · Resume when able     Hyperlipidemia  Assessment & Plan  · Continue home Lipitor 10 mg per NG tube    Bronchospasm  Assessment & Plan  · Patient with episodes of high peak pressures overnight on 7/11, ? bronchospasms- now improving  · Unclear etiology - but timing suggests there may be a component of drug sensitivity  · Pt with significant tachypnea and wheezing following zosyn administration, abx changed to cefepime/flagyl   · Continue scheduled breathing treatments  · Continue IV steroids, will continue to wean solumedrol       HORTENCIA (acute kidney injury) (HCC)resolved as of 7/14/2020  Assessment & Plan  · Likely related to acute infection  Creatinine peaked at 1 29, now back to 0 85  · Follow I/O  · Trend renal indices      Interstitial lung disease ([facility] )  Assessment & Plan  · Patient with history of interstitial lung disease  No pulmonary records to review  Family asked to attempt to obtain records from pulmonologist  · CT chest: Lungs suboptimally evaluated due to respiratory motion with honeycombing at the periphery of the left lung due to pulmonary fibrosis  Left greater than right upper lobe consolidation, dependent in the left upper lobe  The dependent location suggest aspiration  · Patient placed on solumedrol, continue to wean   Currently 20mg q 8 (last weaned 7/13)  · Continue scheduled nebs    Schizo affective schizophrenia ([facility] )  Assessment & Plan  · Continue with risperidone and valproic acid  · Supportive care    Intellectual disability  Assessment & Plan  · Chronic; nature of the disability not clear  · Patient verbal and ambulatory at baseline      ----------------------------------------------------------------------------------------  HPI/24hr events: Patient with one episode of high peak pressures overnight after nursing care  Patient did not appear awake or anxious at the time  Peak pressures at high as 60s, do not improve with suctioning  PRN fentanyl given and propofol briefly titrated up with return of peak pressures to 29  Otherwise, remained hemodynamically stable  Continues to have significant secretions  Disposition: Continue Critical Care   Code Status: Level 1 - Full Code  ---------------------------------------------------------------------------------------  SUBJECTIVE      Review of Systems  Review of systems was unable to be performed secondary to intubated/sedated  ---------------------------------------------------------------------------------------  OBJECTIVE    Vitals   Vitals:    20 0300 20 0338 20 0400 20 0500   BP: 108/53  117/58 121/58   BP Location:   Left arm    Pulse: 82  71 71   Resp: 18  18 18   Temp:   97 8 °F (36 6 °C)    TempSrc:   Tympanic    SpO2: 98% 98% 98% 98%   Weight:       Height:         Temp (24hrs), Av 5 °F (36 4 °C), Min:96 4 °F (35 8 °C), Max:97 9 °F (36 6 °C)  Current: Temperature: 97 8 °F (36 6 °C)          Respiratory:  SpO2: SpO2: 98 %, SpO2 Activity: SpO2 Activity: At Rest       Invasive/non-invasive ventilation settings   Respiratory    Lab Data (Last 4 hours)    None         O2/Vent Data (Last 4 hours)      338           Vent Mode AC/VC       Resp Rate (BPM) (BPM) 18       Vt (mL) (mL) 300       FIO2 (%) (%) 40       PEEP (cmH2O) (cmH2O) 5       MV 5 75                   Physical Exam   Constitutional: She appears well-developed and well-nourished  No distress  She is sedated and intubated  HENT:   Head: Normocephalic and atraumatic  Mouth/Throat: Oropharyngeal exudate present  Eyes: Pupils are equal, round, and reactive to light  EOM are normal  No scleral icterus  Neck: Normal range of motion  Neck supple  No JVD present  Cardiovascular: Normal rate, regular rhythm, normal heart sounds and intact distal pulses   Exam reveals no friction rub  No murmur heard  Pulmonary/Chest: She is intubated  No respiratory distress  She has decreased breath sounds  Abdominal: Soft  Bowel sounds are normal  She exhibits no distension  There is no tenderness  There is no guarding  Musculoskeletal: Normal range of motion  She exhibits no edema  Lymphadenopathy:     She has no cervical adenopathy  Neurological: GCS eye subscore is 3  GCS verbal subscore is 1  GCS motor subscore is 6  Skin: Skin is warm and dry  Capillary refill takes less than 2 seconds  She is not diaphoretic         Laboratory and Diagnostics:  Results from last 7 days   Lab Units 07/13/20 0451 07/12/20 0537 07/11/20 2302 07/11/20  0555 07/10/20  1150   WBC Thousand/uL 7 40 7 16 5 76 4 69 4 55   HEMOGLOBIN g/dL 9 0* 9 1* 9 8* 10 9* 9 9*   HEMATOCRIT % 27 6* 27 1* 29 4* 32 9* 30 6*   PLATELETS Thousands/uL 162 163 169 171 170   NEUTROS PCT % 85* 93* 69 74 86*   MONOS PCT % 7 3* 16* 7 2*     Results from last 7 days   Lab Units 07/13/20 0451 07/12/20 0537 07/11/20 2302 07/11/20  0555 07/10/20  1150   SODIUM mmol/L 137 139 139 134* 133*   POTASSIUM mmol/L 4 4 3 6 3 9 4 4 4 3   CHLORIDE mmol/L 102 101 101 100 97*   CO2 mmol/L 33* 31 31 29 30   ANION GAP mmol/L 2* 7 7 5 6   BUN mg/dL 15 16 17 13 14   CREATININE mg/dL 0 85 1 28 1 29 0 86 0 96   CALCIUM mg/dL 8 0* 8 2* 8 6 8 6 8 4   GLUCOSE RANDOM mg/dL 124 136 117 126 122   ALT U/L  --   --   --   --  17   AST U/L  --   --   --   --  25   ALK PHOS U/L  --   --   --   --  47   ALBUMIN g/dL  --   --   --   --  2 6*   TOTAL BILIRUBIN mg/dL  --   --   --   --  0 30     Results from last 7 days   Lab Units 07/13/20 0451 07/12/20 0537 07/11/20 2302 07/11/20  0555 07/10/20  1150   MAGNESIUM mg/dL 2 4 2 2 2 1 2 4 1 6   PHOSPHORUS mg/dL  --   --  3 3 4 8*  --                    ABG:  Results from last 7 days   Lab Units 07/12/20  0120   PH ART  7 323*   PCO2 ART mm Hg 63 3*   PO2 ART mm Hg 96 7   HCO3 ART mmol/L 32 1* BASE EXC ART mmol/L 4 8   ABG SOURCE  Radial, Right     VBG:  Results from last 7 days   Lab Units 07/12/20  0120  07/11/20  2302   PH TL   --   --  7 504*   PCO2 TL mm Hg  --   --  35 9*   PO2 TL mm Hg  --   --  131 6*   HCO3 TL mmol/L  --   --  27 6   BASE EXC TL mmol/L  --   --  4 4   ABG SOURCE  Radial, Right   < >  --     < > = values in this interval not displayed  Results from last 7 days   Lab Units 07/13/20  0451 07/11/20  0555   PROCALCITONIN ng/ml <0 05 <0 05       Micro  Results from last 7 days   Lab Units 07/12/20  1142 07/11/20  2335 07/10/20  1653 07/10/20  1436   SPUTUM CULTURE  No growth  --  2+ Growth of   --    GRAM STAIN RESULT  Rare Epithelial cells per low power field*  Rare Polys*  Rare Gram positive cocci in pairs*  --  No Polys or Bacteria seen  --    URINE CULTURE   --  No Growth <1000 cfu/mL  --   --    MRSA CULTURE ONLY   --   --   --  No Methicillin Resistant Staphlyococcus aureus (MRSA) isolated       EKG: normal sinus rhythm   Imaging: I have personally reviewed pertinent reports  and I have personally reviewed pertinent films in PACS    Intake and Output  I/O       07/12 0701 - 07/13 0700 07/13 0701 - 07/14 0700    I V  (mL/kg) 2216 2 (35 5) 616 (9 9)    NG/ 1240    IV Piggyback  405    Feedings  360    Total Intake(mL/kg) 2516 2 (40 3) 2621 (42)    Urine (mL/kg/hr) 1305 (0 9) 800 (0 5)    Stool  0    Total Output 1305 800    Net +1211 2 +1821          Unmeasured Stool Occurrence  1 x          Height and Weights   Height: 5' (152 4 cm)  IBW: 45 5 kg  Body mass index is 26 87 kg/m²    Weight (last 2 days)     Date/Time   Weight    07/13/20 0537   62 4 (137 57)    07/12/20 0531   62 6 (138 01)                Nutrition       Diet Orders   (From admission, onward)             Start     Ordered    07/10/20 1929  Diet Enteral/Parenteral; Tube Feeding No Oral Diet; Jevity 1 2 Bob; Continuous; 30; 75; Water; Every 4 hours  Diet effective now     Question Answer Comment Diet Type Enteral/Parenteral    Enteral/Parenteral Tube Feeding No Oral Diet    Tube Feeding Formula: Jevity 1 2 Bob    Bolus/Cyclic/Continuous Continuous    Tube Feeding Goal Rate (mL/hr): 30    Tube Feeding water flush (mL): 75    Water Flush type: Water    Water flush frequency: Every 4 hours    RD to adjust diet per protocol?  No        07/10/20 1929                  Active Medications  Scheduled Meds:    Current Facility-Administered Medications:  acetaminophen 650 mg Oral Q6H PRN Catrachita Mcdowell PA-C    albuterol 2 5 mg Nebulization Q4H PRN Symone Santiago PA-C    atorvastatin 10 mg Per NG Tube Daily With Dinner [employer] VMEME    cefepime 2,000 mg Intravenous Q12H Catrachita Mcdowell PA-C Last Rate: Stopped (07/14/20 0050)   chlorhexidine 15 mL [employer] Q12H [address] Catrachita Mcdowell PA-C    enoxaparin 40 mg Subcutaneous Q24H [address] Bernett Less, MEME    famotidine 20 mg Intravenous Q24H [address] Symone Santiago PA-C    fentanyl citrate (PF) 50 mcg Intravenous Q2H PRN Bernbhavin Less, MEME    insulin lispro 1-5 Units Subcutaneous Q6H [address] Bernbhavin Less, MEME    ipratropium-albuterol 3 mL Nebulization Q6H Flora Diane PA-C    levothyroxine 75 mcg Oral Early Morning MEME Coleman    methylPREDNISolone sodium succinate 20 mg Intravenous [facility] Symone Santiago PA-C    metroNIDAZOLE 500 mg Intravenous Q8H Catrachita Mcdowell PA-C Last Rate: Stopped (07/14/20 0018)   propofol 5-50 mcg/kg/min Intravenous Titrated Catrachita Mcdowell PA-C Last Rate: 30 mcg/kg/min (07/14/20 0143)   risperiDONE 2 mg Oral BID Symone Santiago PA-C    senna 8 8 mg Per NG Tube BID Blanca Spironello MEME SLOAN    valproic acid 250 mg Oral Q12H [address] Symone Santiago PA-C      Continuous Infusions:    propofol 5-50 mcg/kg/min Last Rate: 30 mcg/kg/min (07/14/20 0143)     PRN Meds:     acetaminophen 650 mg Q6H PRN   albuterol 2 5 mg Q4H PRN   fentanyl citrate (PF) 50 mcg Q2H PRN Invasive Devices Review  Invasive Devices     Peripheral Intravenous Line            Peripheral IV 07/10/20 Left Antecubital 3 days    Peripheral IV 07/14/20 Right Hand less than 1 day    Peripheral IV 07/14/20 Right;Upper Arm less than 1 day          Drain            NG/OG/Enteral Tube Orogastric 16 Fr Right mouth 3 days    Urethral Catheter 16 Fr  3 days          Airway            ETT  Oral 6 5 mm 4 days                Rationale for remaining devices:   ---------------------------------------------------------------------------------------  Advance Directive and Living Will:      Power of :    POLST:    ---------------------------------------------------------------------------------------  Care Time Delivered:   No Critical Care time spent       MEME Davis      Portions of the record may have been created with voice recognition software  Occasional wrong word or "sound a like" substitutions may have occurred due to the inherent limitations of voice recognition software    Read the chart carefully and recognize, using context, where substitutions have occurred

## 2020-07-14 NOTE — PROGRESS NOTES
Recommend Jevity 1 2 at 50 mL/hour for a total volume of 1200 mL  This will provide 1440 kcals, 67 grams protein and 968 mL free water  Recommend flushes of 100 mL q 4 hours for a total fluid intake of 1568 mL

## 2020-07-14 NOTE — RESPIRATORY THERAPY NOTE
Pt received with ETT 17 at lips  Obtained order to reposition  Returned ETT to 20 at lips  Pt had some anxiety and became tachypneic   After RN sedated, pt settled to 28 bpm

## 2020-07-14 NOTE — ASSESSMENT & PLAN NOTE
· Likely related to acute infection   Creatinine peaked at 1 29, now back to 0 85  · Follow I/O  · Trend renal indices

## 2020-07-15 PROBLEM — J45.909 ASTHMA: Chronic | Status: ACTIVE | Noted: 2019-03-04

## 2020-07-15 PROBLEM — E07.9 DISEASE OF THYROID GLAND: Chronic | Status: ACTIVE | Noted: 2020-07-10

## 2020-07-15 PROBLEM — F79 INTELLECTUAL DISABILITY: Chronic | Status: ACTIVE | Noted: 2020-07-10

## 2020-07-15 PROBLEM — I10 HYPERTENSION: Chronic | Status: ACTIVE | Noted: 2020-07-10

## 2020-07-15 PROBLEM — F25.9 SCHIZO AFFECTIVE SCHIZOPHRENIA (HCC): Chronic | Status: ACTIVE | Noted: 2020-07-10

## 2020-07-15 PROBLEM — E78.5 HYPERLIPIDEMIA: Chronic | Status: ACTIVE | Noted: 2020-07-10

## 2020-07-15 LAB
ANION GAP SERPL CALCULATED.3IONS-SCNC: 2 MMOL/L (ref 4–13)
BUN SERPL-MCNC: 22 MG/DL (ref 5–25)
CALCIUM SERPL-MCNC: 8.7 MG/DL (ref 8.3–10.1)
CHLORIDE SERPL-SCNC: 101 MMOL/L (ref 100–108)
CO2 SERPL-SCNC: 34 MMOL/L (ref 21–32)
CREAT SERPL-MCNC: 0.87 MG/DL (ref 0.6–1.3)
GFR SERPL CREATININE-BSD FRML MDRD: 74 ML/MIN/1.73SQ M
GLUCOSE SERPL-MCNC: 101 MG/DL (ref 65–140)
GLUCOSE SERPL-MCNC: 138 MG/DL (ref 65–140)
GLUCOSE SERPL-MCNC: 146 MG/DL (ref 65–140)
GLUCOSE SERPL-MCNC: 159 MG/DL (ref 65–140)
GLUCOSE SERPL-MCNC: 86 MG/DL (ref 65–140)
MAGNESIUM SERPL-MCNC: 2.3 MG/DL (ref 1.6–2.6)
PHOSPHATE SERPL-MCNC: 3.5 MG/DL (ref 2.7–4.5)
POTASSIUM SERPL-SCNC: 4.6 MMOL/L (ref 3.5–5.3)
PROCALCITONIN SERPL-MCNC: <0.05 NG/ML
SODIUM SERPL-SCNC: 137 MMOL/L (ref 136–145)

## 2020-07-15 PROCEDURE — 94150 VITAL CAPACITY TEST: CPT

## 2020-07-15 PROCEDURE — NC001 PR NO CHARGE: Performed by: NURSE PRACTITIONER

## 2020-07-15 PROCEDURE — 99233 SBSQ HOSP IP/OBS HIGH 50: CPT | Performed by: ANESTHESIOLOGY

## 2020-07-15 PROCEDURE — 94003 VENT MGMT INPAT SUBQ DAY: CPT

## 2020-07-15 PROCEDURE — 83735 ASSAY OF MAGNESIUM: CPT | Performed by: ANESTHESIOLOGY

## 2020-07-15 PROCEDURE — 84145 PROCALCITONIN (PCT): CPT | Performed by: ANESTHESIOLOGY

## 2020-07-15 PROCEDURE — 94640 AIRWAY INHALATION TREATMENT: CPT

## 2020-07-15 PROCEDURE — 84100 ASSAY OF PHOSPHORUS: CPT | Performed by: ANESTHESIOLOGY

## 2020-07-15 PROCEDURE — 82948 REAGENT STRIP/BLOOD GLUCOSE: CPT

## 2020-07-15 PROCEDURE — 80048 BASIC METABOLIC PNL TOTAL CA: CPT | Performed by: ANESTHESIOLOGY

## 2020-07-15 PROCEDURE — 94760 N-INVAS EAR/PLS OXIMETRY 1: CPT

## 2020-07-15 RX ADMIN — CHLORHEXIDINE GLUCONATE 0.12% ORAL RINSE 15 ML: 1.2 LIQUID ORAL at 08:37

## 2020-07-15 RX ADMIN — METRONIDAZOLE 500 MG: 500 INJECTION, SOLUTION INTRAVENOUS at 16:35

## 2020-07-15 RX ADMIN — IPRATROPIUM BROMIDE AND ALBUTEROL SULFATE 3 ML: 2.5; .5 SOLUTION RESPIRATORY (INHALATION) at 19:01

## 2020-07-15 RX ADMIN — IPRATROPIUM BROMIDE AND ALBUTEROL SULFATE 3 ML: 2.5; .5 SOLUTION RESPIRATORY (INHALATION) at 14:09

## 2020-07-15 RX ADMIN — ENOXAPARIN SODIUM 40 MG: 40 INJECTION SUBCUTANEOUS at 08:40

## 2020-07-15 RX ADMIN — METHYLPREDNISOLONE SODIUM SUCCINATE 20 MG: 40 INJECTION, POWDER, FOR SOLUTION INTRAMUSCULAR; INTRAVENOUS at 06:08

## 2020-07-15 RX ADMIN — METHYLPREDNISOLONE SODIUM SUCCINATE 20 MG: 40 INJECTION, POWDER, FOR SOLUTION INTRAMUSCULAR; INTRAVENOUS at 22:08

## 2020-07-15 RX ADMIN — INSULIN LISPRO 1 UNITS: 100 INJECTION, SOLUTION INTRAVENOUS; SUBCUTANEOUS at 12:06

## 2020-07-15 RX ADMIN — IPRATROPIUM BROMIDE AND ALBUTEROL SULFATE 3 ML: 2.5; .5 SOLUTION RESPIRATORY (INHALATION) at 08:26

## 2020-07-15 RX ADMIN — METHYLPREDNISOLONE SODIUM SUCCINATE 20 MG: 40 INJECTION, POWDER, FOR SOLUTION INTRAMUSCULAR; INTRAVENOUS at 14:12

## 2020-07-15 RX ADMIN — VALPROIC ACID 250 MG: 250 SOLUTION ORAL at 08:38

## 2020-07-15 RX ADMIN — Medication 2000 MG: at 00:50

## 2020-07-15 RX ADMIN — LEVOTHYROXINE SODIUM 75 MCG: 75 TABLET ORAL at 06:08

## 2020-07-15 RX ADMIN — IPRATROPIUM BROMIDE AND ALBUTEROL SULFATE 3 ML: 2.5; .5 SOLUTION RESPIRATORY (INHALATION) at 01:27

## 2020-07-15 RX ADMIN — RISPERIDONE 2 MG: 1 TABLET ORAL at 08:37

## 2020-07-15 RX ADMIN — Medication 2000 MG: at 11:51

## 2020-07-15 RX ADMIN — FAMOTIDINE 20 MG: 10 INJECTION INTRAVENOUS at 08:34

## 2020-07-15 RX ADMIN — METRONIDAZOLE 500 MG: 500 INJECTION, SOLUTION INTRAVENOUS at 08:33

## 2020-07-15 NOTE — PROGRESS NOTES
Progress Note - Mena Zavala 1960, 64 y o  female MRN: [id number]    Unit/Bed#: ICU 07 Encounter: [id number]    Primary Care Provider: No primary care provider on file  Date and time admitted to hospital: 7/10/2020 11:18 AM        Bronchospasm  Assessment & Plan  · Patient with episodes of high peak pressures overnight on 7/11, ? bronchospasms- now improving  · Unclear etiology - but timing suggests there may be a component of drug sensitivity  · Pt with significant tachypnea and wheezing following zosyn administration, abx changed to cefepime/flagyl   · Continue scheduled breathing treatments  · Continue IV steroids, will continue to wean solumedrol       * Acute on chronic respiratory failure with hypercapnia [facility])  Assessment & Plan  Patient intubated prior to dental cleaning  Extubated post procedure but had increased work of breathing and therefore re-intubated  Likely secondary to hx of pulmonary fibrosis with possible aspiration pneumonia/pneumonitis  Patient is known to be on 1 L O2 at baseline  Patient has pulmonary fibrosis with suspected right middle lobe aspiration pneumonia on CT scan  ABG on arrival shows mild respiratory acidosis with pH of 7 338 and pCO2 of 64  Likely component of chronic hypercapnia   · Day 6 antibiotics - cefepime/flagyl - complete 7 day course  · MV day 6 - SBT and sedation vacation daily - plan for extubation today --> successful extubation, currently saturating well on 2L NC    · Continue pulmonary hygiene     Interstitial lung disease ([facility] [address] )  Assessment & Plan  · Patient with history of interstitial lung disease  No pulmonary records to review  Family asked to attempt to obtain records from pulmonologist  · CT chest: Lungs suboptimally evaluated due to respiratory motion with honeycombing at the periphery of the left lung due to pulmonary fibrosis  Left greater than right upper lobe consolidation, dependent in the left upper lobe    The dependent location suggest aspiration  · Patient placed on solumedrol, continue to wean  Currently 20mg q 8 (last weaned 7/13)  · Continue scheduled nebs    Aspiration pneumonia ([facility] [address] )  Assessment & Plan  · ? Aspiration during dental procedure  · Gastric distention noted on admission CT scan which could have contributed to possible aspiration  · Day 6 antibiotics - cefepime/flagyl  · WBC remains normal  · Sputum culture pending, preliminarily with no bacteria  · Repeat sputum culture sent 7/12 --> Rare Epithelial cells per low power field, rare Polys, rare Gram positive cocci in pairs  Disease of thyroid gland  Assessment & Plan  · continue with home levothyroxine 75 mcg    Hypertension  Assessment & Plan  · Patient's BP stable on arrival  · Hold home norvasc for now given soft BP - likely 2/2 sedation   · Resume when able     Hyperlipidemia  Assessment & Plan  · Continue home Lipitor 10 mg      Schizo affective schizophrenia ([facility]  Assessment & Plan  · Continue with risperidone and valproic acid  · Supportive care    Intellectual disability  Assessment & Plan  · Chronic; nature of the disability not clear  · Patient verbal and ambulatory at baseline      ----------------------------------------------------------------------------------------  HPI/24hr events: Occasional distress with high peak pressure on vent  Disposition: Continue Critical Care   Code Status: Level 1 - Full Code  ---------------------------------------------------------------------------------------  SUBJECTIVE  Sedated but responsive and follows commands  Shakes yes for right sided abdominal pain       Review of Systems  Review of systems was unable to be performed secondary to sedation and intubation  ---------------------------------------------------------------------------------------  OBJECTIVE    Vitals   Vitals:    07/15/20 1340 07/15/20 1400 07/15/20 1409 07/15/20 1500   BP:  152/98  163/85   BP Location:    Left arm   Pulse:  65  65   Resp:  22  (!) 24 Temp:    (!) 97 4 °F (36 3 °C)   TempSrc:    Tympanic   SpO2: 97% 100% 96% 97%   Weight:       Height:         Temp (24hrs), Av 6 °F (36 4 °C), Min:97 2 °F (36 2 °C), Max:98 3 °F (36 8 °C)  Current: Temperature: (!) 97 4 °F (36 3 °C)          Respiratory:  SpO2: SpO2: 97 %  Nasal Cannula O2 Flow Rate (L/min): 2 L/min    Invasive/non-invasive ventilation settings   Respiratory    Lab Data (Last 4 hours)    None         O2/Vent Data (Last 4 hours)    None                Physical Exam    Laboratory and Diagnostics:  Results from last 7 days   Lab Units 20  0540 20  0451 20  0537 20  23020  0555 07/10/20  1150   WBC Thousand/uL 6 44 7 40 7 16 5 76 4 69 4 55   HEMOGLOBIN g/dL 9 0* 9 0* 9 1* 9 8* 10 9* 9 9*   HEMATOCRIT % 28 2* 27 6* 27 1* 29 4* 32 9* 30 6*   PLATELETS Thousands/uL 189 162 163 169 171 170   NEUTROS PCT % 81* 85* 93* 69 74 86*   MONOS PCT % 9 7 3* 16* 7 2*     Results from last 7 days   Lab Units 07/15/20  0614 20  0540 20  04520  0537 20  23020  0555 07/10/20  1150   SODIUM mmol/L 137 138 137 139 139 134* 133*   POTASSIUM mmol/L 4 6 4 6 4 4 3 6 3 9 4 4 4 3   CHLORIDE mmol/L 101 103 102 101 101 100 97*   CO2 mmol/L 34* 34* 33* 31 31 29 30   ANION GAP mmol/L 2* 1* 2* 7 7 5 6   BUN mg/dL 22 18 15 16 17 13 14   CREATININE mg/dL 0 87 0 90 0 85 1 28 1 29 0 86 0 96   CALCIUM mg/dL 8 7 8 3 8 0* 8 2* 8 6 8 6 8 4   GLUCOSE RANDOM mg/dL 146* 141* 124 136 117 126 122   ALT U/L  --   --   --   --   --   --  17   AST U/L  --   --   --   --   --   --  25   ALK PHOS U/L  --   --   --   --   --   --  47   ALBUMIN g/dL  --   --   --   --   --   --  2 6*   TOTAL BILIRUBIN mg/dL  --   --   --   --   --   --  0 30     Results from last 7 days   Lab Units 07/15/20  0614 20  0540 20  0451 20  0537 20  2302 20  0555 07/10/20  1150   MAGNESIUM mg/dL 2 3 2 4 2 4 2 2 2 1 2 4 1 6   PHOSPHORUS mg/dL 3 5 3 4  --   --  3 3 4 8*  -- ABG:  Results from last 7 days   Lab Units 07/12/20  0120   PH ART  7 323*   PCO2 ART mm Hg 63 3*   PO2 ART mm Hg 96 7   HCO3 ART mmol/L 32 1*   BASE EXC ART mmol/L 4 8   ABG SOURCE  Radial, Right     VBG:  Results from last 7 days   Lab Units 07/12/20  0120  07/11/20  2302   PH TL   --   --  7 504*   PCO2 TL mm Hg  --   --  35 9*   PO2 TL mm Hg  --   --  131 6*   HCO3 TL mmol/L  --   --  27 6   BASE EXC TL mmol/L  --   --  4 4   ABG SOURCE  Radial, Right   < >  --     < > = values in this interval not displayed  Results from last 7 days   Lab Units 07/15/20  0614 07/13/20  0451 07/11/20  0555   PROCALCITONIN ng/ml <0 05 <0 05 <0 05       Micro  Results from last 7 days   Lab Units 07/12/20  1142 07/11/20  2335 07/10/20  1653 07/10/20  1436   SPUTUM CULTURE  No growth  --  2+ Growth of   --    GRAM STAIN RESULT  Rare Epithelial cells per low power field*  Rare Polys*  Rare Gram positive cocci in pairs*  --  No Polys or Bacteria seen  --    URINE CULTURE   --  No Growth <1000 cfu/mL  --   --    MRSA CULTURE ONLY   --   --   --  No Methicillin Resistant Staphlyococcus aureus (MRSA) isolated       EKG: No events on telemetry  Imaging: I have personally reviewed pertinent reports  Intake and Output  I/O       07/13 0701 - 07/14 0700 07/14 0701 - 07/15 0700 07/15 0701 - 07/16 0700    I V  (mL/kg) 848 3 (13 7) 170 (2 6) 37 (0 6)    NG/GT 1345 1335 95    IV Piggyback 405 450 250    Feedings 720 720 165    Total Intake(mL/kg) 3318 3 (53 7) 2675 (41 1) 547 (8 4)    Urine (mL/kg/hr) 1050 (0 7) 1500 (1) 350 (0 6)    Stool 0 0 0    Total Output 1050 1500 350    Net +2268 3 +1175 +197           Unmeasured Urine Occurrence  1 x     Unmeasured Stool Occurrence 1 x 2 x 2 x          Height and Weights   Height: 5' (152 4 cm)  IBW: 45 5 kg  Body mass index is 28 03 kg/m²    Weight (last 2 days)     Date/Time   Weight    07/15/20 0600   65 1 (143 52)    07/14/20 0546   61 8 (136 24)    07/13/20 1312 62 4 (334 05)                Nutrition       Diet Orders   (From admission, onward)             Start     Ordered    07/15/20 1634  Diet NPO  Diet effective now     Question Answer Comment   Diet Type NPO    RD to adjust diet per protocol? No        07/15/20 1634    07/14/20 1101  Room Service  Once     Question:  Type of Service  Answer:  Room Service- Not Appropriate    07/14/20 1100                  Active Medications  Scheduled Meds:  Current Facility-Administered Medications:  acetaminophen 650 mg Oral Q6H PRN Michae LineDONNA    albuterol 2 5 mg Nebulization Q4H PRN Michae LineDONNA    atorvastatin 10 mg Per NG Tube Daily With Dinner [employer] VMEME    cefepime 2,000 mg Intravenous Q12H Alfonsoae DONNA Sarmiento Last Rate: Stopped (07/15/20 1220)   enoxaparin 40 mg Subcutaneous Q24H [address] MEME Lawrence    famotidine 20 mg Intravenous Q24H [address] Symone Santiago PA-C    fentanyl citrate (PF) 25 mcg Intravenous Q2H PRN Leydi Ashley MD    insulin lispro 1-5 Units Subcutaneous Q6H [address] MEME Lawrence    ipratropium-albuterol 3 mL Nebulization Q6H Johnathon Campos PA-C    levothyroxine 75 mcg Oral Early Morning MEME Coleman    methylPREDNISolone sodium succinate 20 mg Intravenous [facility] Symone Santiago PA-C    metroNIDAZOLE 500 mg Intravenous Q8H Symone Santiago PA-C Last Rate: 500 mg (07/15/20 1635)   risperiDONE 2 mg Oral BID Symone Santiago PA-C    valproic acid 250 mg Oral Q12H [address] Symone Santiago PA-C      Continuous Infusions:     PRN Meds:     acetaminophen 650 mg Q6H PRN   albuterol 2 5 mg Q4H PRN   fentanyl citrate (PF) 25 mcg Q2H PRN       Invasive Devices Review  Invasive Devices     Peripheral Intravenous Line            Peripheral IV 07/14/20 Right Hand 1 day    Peripheral IV 07/14/20 Right;Upper Arm 1 day                Rationale for remaining devices: Needs IV access for IV medications, will attempt to extubate today  ---------------------------------------------------------------------------------------  Advance Directive and Living Will:      Power of :    POLST:    ---------------------------------------------------------------------------------------  Care Time Delivered:   Upon my evaluation, this patient had a high probability of imminent or life-threatening deterioration due to AHRF, which required my direct attention, intervention, and personal management  I have personally provided 30 minutes (20 to 40) of critical care time, exclusive of procedures, teaching, family meetings, and any prior time recorded by providers other than myself  Sumanth Ponce MD      Portions of the record may have been created with voice recognition software  Occasional wrong word or "sound a like" substitutions may have occurred due to the inherent limitations of voice recognition software    Read the chart carefully and recognize, using context, where substitutions have occurred

## 2020-07-15 NOTE — RESPIRATORY THERAPY NOTE
Extubated to 2L NC with RN Asia at bedside, clear breath sounds, no stridor, pt able to vocalize in a whisper, denies any resp distress, tolerated well

## 2020-07-15 NOTE — UTILIZATION REVIEW
Continued Stay Review    Date: 7/15/20                        Current Patient Class: Inpatient Current Level of Care: Critical Care    HPI:60 y o  female initially admitted on  7/10/20 with aspiration pneumonia and respiratory failure following a dental cleaning at an outside facility  Assessment/Plan: Extubated to 2L O2 NC oxygen today  Day 6 of 7 day course of IV antibiotics  Continue to wean IV Solu-Medrol       Pertinent Labs/Diagnostic Results:       Results from last 7 days   Lab Units 07/14/20  0540 07/13/20  0451 07/12/20  0537 07/11/20  2302 07/11/20  0555   WBC Thousand/uL 6 44 7 40 7 16 5 76 4 69   HEMOGLOBIN g/dL 9 0* 9 0* 9 1* 9 8* 10 9*   HEMATOCRIT % 28 2* 27 6* 27 1* 29 4* 32 9*   PLATELETS Thousands/uL 189 162 163 169 171   NEUTROS ABS Thousands/µL 5 25 6 24 6 62 3 94 3 42         Results from last 7 days   Lab Units 07/15/20  0614 07/14/20  0540 07/13/20  0451 07/12/20  0537 07/11/20  2302 07/11/20  0555   SODIUM mmol/L 137 138 137 139 139 134*   POTASSIUM mmol/L 4 6 4 6 4 4 3 6 3 9 4 4   CHLORIDE mmol/L 101 103 102 101 101 100   CO2 mmol/L 34* 34* 33* 31 31 29   ANION GAP mmol/L 2* 1* 2* 7 7 5   BUN mg/dL 22 18 15 16 17 13   CREATININE mg/dL 0 87 0 90 0 85 1 28 1 29 0 86   EGFR ml/min/1 73sq m 74 71 76 46 46 75   CALCIUM mg/dL 8 7 8 3 8 0* 8 2* 8 6 8 6   MAGNESIUM mg/dL 2 3 2 4 2 4 2 2 2 1 2 4   PHOSPHORUS mg/dL 3 5 3 4  --   --  3 3 4 8*     Results from last 7 days   Lab Units 07/10/20  1150   AST U/L 25   ALT U/L 17   ALK PHOS U/L 47   TOTAL PROTEIN g/dL 6 8   ALBUMIN g/dL 2 6*   TOTAL BILIRUBIN mg/dL 0 30     Results from last 7 days   Lab Units 07/15/20  1204 07/15/20  0607 07/14/20  2327 07/14/20  1733 07/14/20  1151 07/13/20  2342 07/13/20  1752 07/13/20  1141 07/12/20  2352 07/12/20  1722 07/12/20  1127 07/12/20  0602   POC GLUCOSE mg/dl 159* 138 127 134 148* 141* 103 114 150* 133 119 118     Results from last 7 days   Lab Units 07/15/20  0614 07/14/20  0540 07/13/20  0451 07/12/20  0537 07/11/20  2302 07/11/20  0555 07/10/20  1150   GLUCOSE RANDOM mg/dL 146* 141* 124 136 117 126 122               Results from last 7 days   Lab Units 07/12/20  0120 07/11/20  2311 07/11/20  0545   PH ART  7 323* 7 325* 7 335*   PCO2 ART mm Hg 63 3* 61 0* 59 0*   PO2 ART mm Hg 96 7 75 7 94 3   HCO3 ART mmol/L 32 1* 31 1* 30 8*   BASE EXC ART mmol/L 4 8 3 9 3 6   O2 CONTENT ART mL/dL 14 2* 13 7* 16 3   O2 HGB, ARTERIAL % 96 3 94 2 96 2   ABG SOURCE  Radial, Right Radial, Right Radial, Right     Results from last 7 days   Lab Units 07/11/20  2302   PH TL  7 504*   PCO2 TL mm Hg 35 9*   PO2 TL mm Hg 131 6*   HCO3 TL mmol/L 27 6   BASE EXC TL mmol/L 4 4   O2 CONTENT TL ml/dL 14 5   O2 HGB, VENOUS % 95 6*       Results from last 7 days   Lab Units 07/15/20  0614 07/13/20  0451 07/11/20  0555   PROCALCITONIN ng/ml <0 05 <0 05 <0 05         Results from last 7 days   Lab Units 07/13/20  0451 07/12/20  0537 07/11/20  0555   CRP mg/L 28 3* 25 8* 6 9*         Results from last 7 days   Lab Units 07/11/20  2335   CLARITY UA  Cloudy   COLOR UA  Dark Juliann   SPEC GRAV UA  >=1 030   PH UA  6 5   GLUCOSE UA mg/dl Negative   KETONES UA mg/dl Trace*   BLOOD UA  Large*   PROTEIN UA mg/dl 100 (2+)*   NITRITE UA  Positive*   BILIRUBIN UA  Negative   UROBILINOGEN UA E U /dl 1 0   LEUKOCYTES UA  Small*   WBC UA /hpf Field obscured, unable to enumerate*   RBC UA /hpf Innumerable*   BACTERIA UA /hpf Innumerable*   EPITHELIAL CELLS WET PREP /hpf None Seen         Results from last 7 days   Lab Units 07/12/20  1142 07/11/20  2335 07/10/20  1653   SPUTUM CULTURE  No growth  --  2+ Growth of    GRAM STAIN RESULT  Rare Epithelial cells per low power field*  Rare Polys*  Rare Gram positive cocci in pairs*  --  No Polys or Bacteria seen   URINE CULTURE   --  No Growth <1000 cfu/mL  --        Vital Signs:       Date/Time  Temp  Pulse  Resp  BP  MAP (mmHg)  SpO2  FiO2 (%)     Nasal Cannula O2 Flow Rate (L/min)  O2 Device 07/15/20 1500  97 4 °F (36 3 °C)Abnormal   65  24Abnormal   163/85  115  97 %         Nasal cannula    07/15/20 1409            96 %       2 L/min  Nasal cannula    07/15/20 1400    65  22  152/98  121  100 %             07/15/20 1340            97 %       2 L/min  Nasal cannula    07/15/20 1300    61  22  151/71  101  95 %             07/15/20 1200  98 3 °F (36 8 °C)  60  25Abnormal   162/77  110  97 %  30       Ventilator    07/15/20 1100    60  21  162/75  108  98 %             07/15/20 1000    67  23Abnormal   144/68  98  97 %             07/15/20 0900    67  24Abnormal   154/72  103  96 %  30           07/15/20 0800  97 9 °F (36 6 °C)  61  18  167/75  103  94 %     40 L/min    Ventilator    07/15/20 0700    59  18  187/85Abnormal   120  95 %             07/15/20 0600    59  18  167/82  113  95 %             07/15/20 0500    60  18  187/80Abnormal   115  95 %             07/15/20 0400  97 8 °F (36 6 °C)  59  18  160/65  94  95 %  25       Ventilator    07/15/20 0000  97 5 °F (36 4 °C)  61  18  137/67  96  94 %  30       Ventilator    07/14/20 2300  97 2 °F (36 2 °C)Abnormal   60  18  167/72  104  96 %             07/14/20 2200    64  19  155/69  99  95 %             07/14/20 2100    78  20  143/65  94  95 %             07/14/20 2000  97 8 °F (36 6 °C)  71  31Abnormal   156/69  99  99 %  30       Ventilator    07/14/20 1900    60  21  172/70Abnormal   101  98 %             07/14/20 1700    61  22  147/68  98  98 %             07/14/20 1600  97 4 °F (36 3 °C)Abnormal   73  30Abnormal   153/70  100  99 %                     Medications:   Scheduled Medications:    Medications:  atorvastatin 10 mg Per NG Tube Daily With Dinner   cefepime 2,000 mg Intravenous Q12H   enoxaparin 40 mg Subcutaneous Q24H ALEJANDRO   famotidine 20 mg Intravenous Q24H ALEJANDRO   insulin lispro 1-5 Units Subcutaneous Q6H [address]   ipratropium-albuterol 3 mL Nebulization Q6H   levothyroxine 75 mcg Oral Early Morning   methylPREDNISolone sodium succinate 20 mg Intravenous Q8H [address]   metroNIDAZOLE 500 mg Intravenous Q8H   risperiDONE 2 mg Oral BID   valproic acid 250 mg Oral Q12H [address]     Continuous IV Infusions: None  PRN Meds:    acetaminophen 650 mg Oral Q6H PRN   albuterol 2 5 mg Nebulization Q4H PRN   fentanyl citrate (PF) 25 mcg Intravenous Q2H PRN       Discharge Plan: TBD        Network Utilization Review Department  [email or web address]  ATTENTION: Please call with any questions or concerns to [phone] and carefully listen to the prompts so that you are directed to the right person  All voicemails are confidential   Brady Nuñez all requests for admission clinical reviews, approved or denied determinations and any other requests to dedicated fax number below belonging to the campus where the patient is receiving treatment   List of dedicated fax numbers for the Facilities:  [address] [place] [address] DENIALS (Administrative/Medical Necessity) [phone]   [address] [place] [address] (Maternity/NICU/Pediatrics) [phone]   [facility] [phone]   Ben Hutchinson [phone]   [facility] [phone]   Ana Rosa Tellez [phone]   [address] [phone]   Mikhail Guajardo [phone]   [address] [facility]  [address] [address] [address] [facility] [phone]

## 2020-07-15 NOTE — ASSESSMENT & PLAN NOTE
· Patient with history of interstitial lung disease  No pulmonary records to review  Family asked to attempt to obtain records from pulmonologist  · CT chest: Lungs suboptimally evaluated due to respiratory motion with honeycombing at the periphery of the left lung due to pulmonary fibrosis  Left greater than right upper lobe consolidation, dependent in the left upper lobe  The dependent location suggest aspiration  · Patient placed on solumedrol, continue to wean   Currently 20mg q 8 (last weaned 7/13)  · Continue scheduled nebs

## 2020-07-15 NOTE — ASSESSMENT & PLAN NOTE
· ? Aspiration during dental procedure  · Gastric distention noted on admission CT scan which could have contributed to possible aspiration  · Day 6 antibiotics - cefepime/flagyl  · WBC remains normal  · Sputum culture pending, preliminarily with no bacteria  · Repeat sputum culture sent 7/12 --> Rare Epithelial cells per low power field, rare Polys, rare Gram positive cocci in pairs

## 2020-07-15 NOTE — ASSESSMENT & PLAN NOTE
Patient intubated prior to dental cleaning  Extubated post procedure but had increased work of breathing and therefore re-intubated  Likely secondary to hx of pulmonary fibrosis with possible aspiration pneumonia/pneumonitis  Patient is known to be on 1 L O2 at baseline  Patient has pulmonary fibrosis with suspected right middle lobe aspiration pneumonia on CT scan  ABG on arrival shows mild respiratory acidosis with pH of 7 338 and pCO2 of 64   Likely component of chronic hypercapnia   · Day 6 antibiotics - cefepime/flagyl - complete 7 day course  · MV day 6 - SBT and sedation vacation daily - plan for extubation today --> successful extubation, currently saturating well on 2L NC    · Continue pulmonary hygiene

## 2020-07-15 NOTE — PROGRESS NOTES
Caregiver Tony Simeon Cleverly updated by phone on patient's condition  Aware that patient has been extubated and is tolerating it well  All questions answered

## 2020-07-16 LAB
ANION GAP SERPL CALCULATED.3IONS-SCNC: 3 MMOL/L (ref 4–13)
ATRIAL RATE: 85 BPM
BASOPHILS # BLD AUTO: 0.01 THOUSANDS/ΜL (ref 0–0.1)
BASOPHILS NFR BLD AUTO: 0 % (ref 0–1)
BUN SERPL-MCNC: 23 MG/DL (ref 5–25)
CALCIUM SERPL-MCNC: 8.7 MG/DL (ref 8.3–10.1)
CHLORIDE SERPL-SCNC: 104 MMOL/L (ref 100–108)
CO2 SERPL-SCNC: 32 MMOL/L (ref 21–32)
CREAT SERPL-MCNC: 0.83 MG/DL (ref 0.6–1.3)
EOSINOPHIL # BLD AUTO: 0 THOUSAND/ΜL (ref 0–0.61)
EOSINOPHIL NFR BLD AUTO: 0 % (ref 0–6)
ERYTHROCYTE [DISTWIDTH] IN BLOOD BY AUTOMATED COUNT: 12.9 % (ref 11.6–15.1)
GFR SERPL CREATININE-BSD FRML MDRD: 78 ML/MIN/1.73SQ M
GLUCOSE SERPL-MCNC: 74 MG/DL (ref 65–140)
GLUCOSE SERPL-MCNC: 83 MG/DL (ref 65–140)
GLUCOSE SERPL-MCNC: 84 MG/DL (ref 65–140)
GLUCOSE SERPL-MCNC: 99 MG/DL (ref 65–140)
HCT VFR BLD AUTO: 31.6 % (ref 34.8–46.1)
HGB BLD-MCNC: 10.2 G/DL (ref 11.5–15.4)
IMM GRANULOCYTES # BLD AUTO: 0.02 THOUSAND/UL (ref 0–0.2)
IMM GRANULOCYTES NFR BLD AUTO: 0 % (ref 0–2)
LYMPHOCYTES # BLD AUTO: 0.74 THOUSANDS/ΜL (ref 0.6–4.47)
LYMPHOCYTES NFR BLD AUTO: 11 % (ref 14–44)
MAGNESIUM SERPL-MCNC: 2.2 MG/DL (ref 1.6–2.6)
MCH RBC QN AUTO: 31.6 PG (ref 26.8–34.3)
MCHC RBC AUTO-ENTMCNC: 32.3 G/DL (ref 31.4–37.4)
MCV RBC AUTO: 98 FL (ref 82–98)
MONOCYTES # BLD AUTO: 0.62 THOUSAND/ΜL (ref 0.17–1.22)
MONOCYTES NFR BLD AUTO: 9 % (ref 4–12)
NEUTROPHILS # BLD AUTO: 5.21 THOUSANDS/ΜL (ref 1.85–7.62)
NEUTS SEG NFR BLD AUTO: 80 % (ref 43–75)
NRBC BLD AUTO-RTO: 0 /100 WBCS
P AXIS: 101 DEGREES
PHOSPHATE SERPL-MCNC: 3.5 MG/DL (ref 2.7–4.5)
PLATELET # BLD AUTO: 232 THOUSANDS/UL (ref 149–390)
PMV BLD AUTO: 10.8 FL (ref 8.9–12.7)
POTASSIUM SERPL-SCNC: 4.5 MMOL/L (ref 3.5–5.3)
PR INTERVAL: 120 MS
QRS AXIS: 44 DEGREES
QRSD INTERVAL: 74 MS
QT INTERVAL: 332 MS
QTC INTERVAL: 395 MS
RBC # BLD AUTO: 3.23 MILLION/UL (ref 3.81–5.12)
SODIUM SERPL-SCNC: 139 MMOL/L (ref 136–145)
T WAVE AXIS: -53 DEGREES
VENTRICULAR RATE: 85 BPM
WBC # BLD AUTO: 6.6 THOUSAND/UL (ref 4.31–10.16)

## 2020-07-16 PROCEDURE — 94760 N-INVAS EAR/PLS OXIMETRY 1: CPT

## 2020-07-16 PROCEDURE — 83735 ASSAY OF MAGNESIUM: CPT | Performed by: INTERNAL MEDICINE

## 2020-07-16 PROCEDURE — 82948 REAGENT STRIP/BLOOD GLUCOSE: CPT

## 2020-07-16 PROCEDURE — 97116 GAIT TRAINING THERAPY: CPT

## 2020-07-16 PROCEDURE — 99232 SBSQ HOSP IP/OBS MODERATE 35: CPT | Performed by: ANESTHESIOLOGY

## 2020-07-16 PROCEDURE — 94640 AIRWAY INHALATION TREATMENT: CPT

## 2020-07-16 PROCEDURE — 85025 COMPLETE CBC W/AUTO DIFF WBC: CPT | Performed by: INTERNAL MEDICINE

## 2020-07-16 PROCEDURE — 92610 EVALUATE SWALLOWING FUNCTION: CPT

## 2020-07-16 PROCEDURE — 80048 BASIC METABOLIC PNL TOTAL CA: CPT | Performed by: INTERNAL MEDICINE

## 2020-07-16 PROCEDURE — 93010 ELECTROCARDIOGRAM REPORT: CPT | Performed by: INTERNAL MEDICINE

## 2020-07-16 PROCEDURE — 97110 THERAPEUTIC EXERCISES: CPT

## 2020-07-16 PROCEDURE — 84100 ASSAY OF PHOSPHORUS: CPT | Performed by: INTERNAL MEDICINE

## 2020-07-16 RX ORDER — AMLODIPINE BESYLATE 5 MG/1
5 TABLET ORAL DAILY
Status: DISCONTINUED | OUTPATIENT
Start: 2020-07-16 | End: 2020-07-18 | Stop reason: HOSPADM

## 2020-07-16 RX ORDER — HYDROCODONE POLISTIREX AND CHLORPHENIRAMINE POLISTIREX 10; 8 MG/5ML; MG/5ML
5 SUSPENSION, EXTENDED RELEASE ORAL EVERY 12 HOURS PRN
Status: DISCONTINUED | OUTPATIENT
Start: 2020-07-16 | End: 2020-07-18 | Stop reason: HOSPADM

## 2020-07-16 RX ORDER — LOSARTAN POTASSIUM 50 MG/1
50 TABLET ORAL DAILY
Status: DISCONTINUED | OUTPATIENT
Start: 2020-07-16 | End: 2020-07-18 | Stop reason: HOSPADM

## 2020-07-16 RX ORDER — HYDROCHLOROTHIAZIDE 12.5 MG/1
12.5 TABLET ORAL DAILY
Status: DISCONTINUED | OUTPATIENT
Start: 2020-07-16 | End: 2020-07-18 | Stop reason: HOSPADM

## 2020-07-16 RX ORDER — BUDESONIDE 0.5 MG/2ML
0.5 INHALANT ORAL
Status: DISCONTINUED | OUTPATIENT
Start: 2020-07-16 | End: 2020-07-18 | Stop reason: HOSPADM

## 2020-07-16 RX ORDER — LANOLIN ALCOHOL/MO/W.PET/CERES
CREAM (GRAM) TOPICAL 3 TIMES DAILY
Status: DISCONTINUED | OUTPATIENT
Start: 2020-07-16 | End: 2020-07-18 | Stop reason: HOSPADM

## 2020-07-16 RX ADMIN — VALPROIC ACID 250 MG: 250 SOLUTION ORAL at 09:08

## 2020-07-16 RX ADMIN — Medication: at 21:46

## 2020-07-16 RX ADMIN — IPRATROPIUM BROMIDE AND ALBUTEROL SULFATE 3 ML: 2.5; .5 SOLUTION RESPIRATORY (INHALATION) at 20:09

## 2020-07-16 RX ADMIN — METHYLPREDNISOLONE SODIUM SUCCINATE 20 MG: 40 INJECTION, POWDER, FOR SOLUTION INTRAMUSCULAR; INTRAVENOUS at 05:01

## 2020-07-16 RX ADMIN — LOSARTAN POTASSIUM 50 MG: 50 TABLET, FILM COATED ORAL at 12:07

## 2020-07-16 RX ADMIN — Medication 1 APPLICATION: at 12:15

## 2020-07-16 RX ADMIN — AMLODIPINE BESYLATE 5 MG: 5 TABLET ORAL at 14:03

## 2020-07-16 RX ADMIN — Medication 2000 MG: at 00:55

## 2020-07-16 RX ADMIN — VALPROIC ACID 250 MG: 250 SOLUTION ORAL at 21:46

## 2020-07-16 RX ADMIN — BUDESONIDE 0.5 MG: 0.5 INHALANT RESPIRATORY (INHALATION) at 20:09

## 2020-07-16 RX ADMIN — IPRATROPIUM BROMIDE AND ALBUTEROL SULFATE 3 ML: 2.5; .5 SOLUTION RESPIRATORY (INHALATION) at 01:55

## 2020-07-16 RX ADMIN — Medication 1 APPLICATION: at 16:28

## 2020-07-16 RX ADMIN — ENOXAPARIN SODIUM 40 MG: 40 INJECTION SUBCUTANEOUS at 08:47

## 2020-07-16 RX ADMIN — IPRATROPIUM BROMIDE AND ALBUTEROL SULFATE 3 ML: 2.5; .5 SOLUTION RESPIRATORY (INHALATION) at 07:39

## 2020-07-16 RX ADMIN — RISPERIDONE 2 MG: 1 TABLET ORAL at 21:46

## 2020-07-16 RX ADMIN — METRONIDAZOLE 500 MG: 500 INJECTION, SOLUTION INTRAVENOUS at 00:00

## 2020-07-16 RX ADMIN — RISPERIDONE 2 MG: 1 TABLET ORAL at 09:08

## 2020-07-16 RX ADMIN — ATORVASTATIN CALCIUM 10 MG: 10 TABLET, FILM COATED ORAL at 16:27

## 2020-07-16 RX ADMIN — FAMOTIDINE 20 MG: 10 INJECTION INTRAVENOUS at 08:48

## 2020-07-16 RX ADMIN — HYDROCHLOROTHIAZIDE 12.5 MG: 12.5 TABLET ORAL at 12:07

## 2020-07-16 RX ADMIN — METRONIDAZOLE 500 MG: 500 INJECTION, SOLUTION INTRAVENOUS at 08:47

## 2020-07-16 NOTE — PROGRESS NOTES
Progress Note - Becky Lepe 1960, 64 y o  female MRN: [id number]    Unit/Bed#: ICU 07 Encounter: [id number]    Primary Care Provider: No primary care provider on file  Date and time admitted to hospital: 7/10/2020 11:18 AM        Bronchospasm  Assessment & Plan  · Patient with episodes of high peak pressures overnight on 7/11, ? bronchospasms- now improving  · Unclear etiology - but timing suggests there may be a component of drug sensitivity  · Pt with significant tachypnea and wheezing following zosyn administration, abx changed to cefepime/flagyl   · Continue scheduled breathing treatments  · Completed total of 5 days of IV steroids, no more recent wheezing episodes  Will discontinue and switch back to home budesonide nebs  Acute on chronic respiratory failure with hypercapnia [facility])  Assessment & Plan  Patient intubated prior to dental cleaning  Extubated post procedure but had increased work of breathing and therefore re-intubated  Likely secondary to hx of pulmonary fibrosis with possible aspiration pneumonia/pneumonitis  Patient is known to be on 1 L O2 at baseline  Patient has pulmonary fibrosis with suspected right middle lobe aspiration pneumonia on CT scan  ABG on arrival shows mild respiratory acidosis with pH of 7 338 and pCO2 of 64  Likely component of chronic hypercapnia   · Day 7 antibiotics - cefepime/flagyl - will complete total course of 7 days today  · Extubated 7/15, currently stable on 2L O2 per NC (home requirement is 1L)  · Continue pulmonary hygiene   · Has intermittent coughing spells - will restart home antitussive medication  Interstitial lung disease ([address] )  Assessment & Plan  · Patient with history of interstitial lung disease  No pulmonary records to review   Family asked to attempt to obtain records from pulmonologist  · CT chest: Lungs suboptimally evaluated due to respiratory motion with honeycombing at the periphery of the left lung due to pulmonary fibrosis  Left greater than right upper lobe consolidation, dependent in the left upper lobe  The dependent location suggest aspiration  · Completed 5 days of IV steroids for suspected bronchospasm  · Continue scheduled nebs    * Aspiration pneumonia ([facility] )  Assessment & Plan  · ? Aspiration during dental procedure  · Gastric distention noted on admission CT scan which could have contributed to possible aspiration  · Day 6 antibiotics - cefepime/flagyl  · WBC remains normal  · Sputum culture pending, preliminarily with no bacteria  · Repeat sputum culture sent 7/12 --> Rare Epithelial cells per low power field, rare Polys, rare Gram positive cocci in pairs  · Will complete 7 day course of cefepime/flagyl today  Disease of thyroid gland  Assessment & Plan  · continue with home levothyroxine 75 mcg    Hypertension  Assessment & Plan  · Patient's BP stable on arrival  · Hold home norvasc for now given soft BP - likely 2/2 sedation   · Resume when able   · 7/16: BP has been stable with occasional higher readings in systolic 116-682N  Will restart home medication irbersartan/HCTZ and amlodipine  Hyperlipidemia  Assessment & Plan  · Continue home Lipitor 10 mg      Schizo affective schizophrenia ([facility] )  Assessment & Plan  · Continue with risperidone and valproic acid  · Supportive care    Intellectual disability  Assessment & Plan  · Chronic; nature of the disability not clear  · Patient verbal and ambulatory at baseline    ----------------------------------------------------------------------------------------  HPI/24hr events: Intermittent coughing spells overnight  No desaturation with this  Mild tachycardia, but stable blood pressure  Resolved spontaneously after 5-10 minutes  Disposition: Transfer to Med-Surg   Code Status: Level 1 - Full Code  ---------------------------------------------------------------------------------------  SUBJECTIVE  Feels better overall   Did not get much sleep overnight because of coughing spells  Complaints of pain in LLQ abdomen, no relationship with cough  Had 2x loose stools, brown non-bloody, overnight as per RN  Review of Systems   Unable to perform ROS: Psychiatric disorder   Patient is able to verbalize some complaints but speech is often not clear enough for me to comprehend      ---------------------------------------------------------------------------------------  OBJECTIVE    Vitals   Vitals:    20 1100 20 1200 20 1300 20 1400   BP: 141/72 160/66 142/69 158/72   BP Location:  Left arm     Pulse: 87 77 78 76   Resp: (!) 24 20 (!) 24 20   Temp:  98 °F (36 7 °C)     TempSrc:  Tympanic     SpO2: 98% 98% 99% 97%   Weight:       Height:         Temp (24hrs), Av °F (36 7 °C), Min:97 4 °F (36 3 °C), Max:98 6 °F (37 °C)  Current: Temperature: 98 °F (36 7 °C)          Respiratory:  SpO2: SpO2: 97 %  Nasal Cannula O2 Flow Rate (L/min): 2 L/min    Invasive/non-invasive ventilation settings   Respiratory    Lab Data (Last 4 hours)    None         O2/Vent Data (Last 4 hours)    None                Physical Exam   Constitutional: She appears well-developed and well-nourished  No distress  HENT:   Head: Normocephalic and atraumatic  Mouth/Throat: No oropharyngeal exudate  Eyes: Pupils are equal, round, and reactive to light  Conjunctivae and EOM are normal  No scleral icterus  Neck: Normal range of motion  Neck supple  Cardiovascular: Normal rate, regular rhythm, normal heart sounds and intact distal pulses  Exam reveals no gallop and no friction rub  No murmur heard  Pulmonary/Chest: Effort normal and breath sounds normal  No stridor  She has no wheezes  She has no rales  Abdominal: Soft  Bowel sounds are normal  There is tenderness  There is no rebound and no guarding  Musculoskeletal: Normal range of motion  She exhibits no edema  Neurological: She is alert  Skin: Skin is warm and dry         Laboratory and Diagnostics:  Results from last 7 days   Lab Units 07/16/20  0507 07/14/20  0540 07/13/20  0451 07/12/20  0537 07/11/20  2302 07/11/20  0555 07/10/20  1150   WBC Thousand/uL 6 60 6 44 7 40 7 16 5 76 4 69 4 55   HEMOGLOBIN g/dL 10 2* 9 0* 9 0* 9 1* 9 8* 10 9* 9 9*   HEMATOCRIT % 31 6* 28 2* 27 6* 27 1* 29 4* 32 9* 30 6*   PLATELETS Thousands/uL 232 189 162 163 169 171 170   NEUTROS PCT % 80* 81* 85* 93* 69 74 86*   MONOS PCT % 9 9 7 3* 16* 7 2*     Results from last 7 days   Lab Units 07/16/20  0507 07/15/20  0614 07/14/20  0540 07/13/20 0451 07/12/20  0537 07/11/20  2302 07/11/20  0555 07/10/20  1150   SODIUM mmol/L 139 137 138 137 139 139 134* 133*   POTASSIUM mmol/L 4 5 4 6 4 6 4 4 3 6 3 9 4 4 4 3   CHLORIDE mmol/L 104 101 103 102 101 101 100 97*   CO2 mmol/L 32 34* 34* 33* 31 31 29 30   ANION GAP mmol/L 3* 2* 1* 2* 7 7 5 6   BUN mg/dL 23 22 18 15 16 17 13 14   CREATININE mg/dL 0 83 0 87 0 90 0 85 1 28 1 29 0 86 0 96   CALCIUM mg/dL 8 7 8 7 8 3 8 0* 8 2* 8 6 8 6 8 4   GLUCOSE RANDOM mg/dL 99 146* 141* 124 136 117 126 122   ALT U/L  --   --   --   --   --   --   --  17   AST U/L  --   --   --   --   --   --   --  25   ALK PHOS U/L  --   --   --   --   --   --   --  47   ALBUMIN g/dL  --   --   --   --   --   --   --  2 6*   TOTAL BILIRUBIN mg/dL  --   --   --   --   --   --   --  0 30     Results from last 7 days   Lab Units 07/16/20  0507 07/15/20  0614 07/14/20  0540 07/13/20  0451 07/12/20  0537 07/11/20  2302 07/11/20  0555   MAGNESIUM mg/dL 2 2 2 3 2 4 2 4 2 2 2 1 2 4   PHOSPHORUS mg/dL 3 5 3 5 3 4  --   --  3 3 4 8*                   ABG:  Results from last 7 days   Lab Units 07/12/20  0120   PH ART  7 323*   PCO2 ART mm Hg 63 3*   PO2 ART mm Hg 96 7   HCO3 ART mmol/L 32 1*   BASE EXC ART mmol/L 4 8   ABG SOURCE  Radial, Right     VBG:  Results from last 7 days   Lab Units 07/12/20  0120  07/11/20  2302   PH TL   --   --  7 504*   PCO2 TL mm Hg  --   --  35 9*   PO2 TL mm Hg  --   --  131 6*   HCO3 TL mmol/L  --   --  27 6 BASE EXC TL mmol/L  --   --  4 4   ABG SOURCE  Radial, Right   < >  --     < > = values in this interval not displayed  Results from last 7 days   Lab Units 07/15/20  0614 07/13/20  0451 07/11/20  0555   PROCALCITONIN ng/ml <0 05 <0 05 <0 05       Micro  Results from last 7 days   Lab Units 07/12/20  1142 07/11/20  2335 07/10/20  1653 07/10/20  1436   SPUTUM CULTURE  No growth  --  2+ Growth of   --    GRAM STAIN RESULT  Rare Epithelial cells per low power field*  Rare Polys*  Rare Gram positive cocci in pairs*  --  No Polys or Bacteria seen  --    URINE CULTURE   --  No Growth <1000 cfu/mL  --   --    MRSA CULTURE ONLY   --   --   --  No Methicillin Resistant Staphlyococcus aureus (MRSA) isolated       EKG: Episodes of sinus tachycardia, otherwise no events on telemetry  Imaging: I have personally reviewed pertinent reports  Intake and Output  I/O       07/14 0701 - 07/15 0700 07/15 0701 - 07/16 0700 07/16 0701 - 07/17 0700    I V  (mL/kg) 170 (2 6) 47 (0 7) 120 (1 8)    NG/GT 1335 95     IV Piggyback 450 450     Feedings 720 165     Total Intake(mL/kg) 2675 (41 1) 757 (11 6) 120 (1 8)    Urine (mL/kg/hr) 1500 (1) 350 (0 2)     Stool 0 0     Total Output 1500 350     Net +1175 +407 +120           Unmeasured Urine Occurrence 1 x 1 x     Unmeasured Stool Occurrence 2 x 5 x           Height and Weights   Height: 5' (152 4 cm)  IBW: 45 5 kg  Body mass index is 28 12 kg/m²  Weight (last 2 days)     Date/Time   Weight    07/16/20 0544   65 3 (143 96)    07/16/20 0500   65 3 (143 96)    07/15/20 0600   65 1 (143 52)    07/14/20 0546   61 8 (136 24)                Nutrition       Diet Orders   (From admission, onward)             Start     Ordered    07/16/20 1207  Diet Dysphagia/Modified Consistency; Dysphagia 1-Pureed;  Thin Liquid  Diet effective now     Question Answer Comment   Diet Type Dysphagia/Modified Consistency    Dysphagia/Modified Consistency Dysphagia 1-Pureed    Liquid Modifier Thin Liquid RD to adjust diet per protocol?  No        07/16/20 1206    07/14/20 1101  Room Service  Once     Question:  Type of Service  Answer:  Room Service- Not Appropriate    07/14/20 1100                  Active Medications  Scheduled Meds:    Current Facility-Administered Medications:  acetaminophen 650 mg Oral Q6H PRN Chad Mitchell MD   albuterol 2 5 mg Nebulization Q4H PRN Chad Mitchell MD   amLODIPine 5 mg Per NG Tube Daily Chad Mitchell MD   atorvastatin 10 mg Per NG Tube Daily With Huy Polk MD   budesonide 0 5 mg Nebulization BID Chad Mitchell MD   enoxaparin 40 mg Subcutaneous Q24H [address] Chad Mitchell MD   famotidine 20 mg Intravenous Q24H [address] Chad Mitchell MD   losartan 50 mg Oral Daily Chad Mitchell MD   And       hydrochlorothiazide 12 5 mg Oral Daily Chad Mitchell MD   hydrocodone-chlorpheniramine polistirex 5 mL Oral Q12H PRN Chad Mitchell MD   insulin lispro 1-5 Units Subcutaneous Q6H Philip Rhodes MD   ipratropium-albuterol 3 mL Nebulization Q6H Chad Mitchell MD   levothyroxine 75 mcg Oral Early Morning Chad Mitchell MD   risperiDONE 2 mg Oral BID Chad Mitchell MD   valproic acid 250 mg Oral Q12H [address] Chad Mitchell MD   white petrolatum-mineral oil  Topical TID Chad Mitchell MD     Continuous Infusions:     PRN Meds:     acetaminophen 650 mg Q6H PRN   albuterol 2 5 mg Q4H PRN   hydrocodone-chlorpheniramine polistirex 5 mL Q12H PRN       Invasive Devices Review  Invasive Devices     Peripheral Intravenous Line            Peripheral IV 07/14/20 Right Hand 2 days    Peripheral IV 07/14/20 Right;Upper Arm 2 days                Rationale for remaining devices: Needs IV access for IV medications    ---------------------------------------------------------------------------------------  Advance Directive and Living Will:      Power of :    POLST:    ---------------------------------------------------------------------------------------  Care Time Delivered:   No Critical Care time spent Sumanth Ponce MD      Portions of the record may have been created with voice recognition software  Occasional wrong word or "sound a like" substitutions may have occurred due to the inherent limitations of voice recognition software    Read the chart carefully and recognize, using context, where substitutions have occurred

## 2020-07-16 NOTE — ASSESSMENT & PLAN NOTE
Patient intubated prior to dental cleaning  Extubated post procedure but had increased work of breathing and therefore re-intubated  Likely secondary to hx of pulmonary fibrosis with possible aspiration pneumonia/pneumonitis  Patient is known to be on 1 L O2 at baseline  Patient has pulmonary fibrosis with suspected right middle lobe aspiration pneumonia on CT scan  ABG on arrival shows mild respiratory acidosis with pH of 7 338 and pCO2 of 64  Likely component of chronic hypercapnia   · Day 7 antibiotics - cefepime/flagyl - will complete total course of 7 days today  · Extubated 7/15, currently stable on 2L O2 per NC (home requirement is 1L)  · Continue pulmonary hygiene   · Has intermittent coughing spells - will restart home antitussive medication

## 2020-07-16 NOTE — PHYSICAL THERAPY NOTE
PT TREATMENT     07/16/20 1140   Pain Assessment   Pain Assessment Tool Pain Assessment not indicated - pt denies pain   Restrictions/Precautions   Other Precautions Fall Risk;O2;Cognitive; Bed Alarm; Chair Alarm   General   Chart Reviewed Yes   Family/Caregiver Present Yes  (manager from [address] [place] [address])   Subjective   Subjective "I'm sick, I'm sick"   Transfers   Sit to Stand 4  Minimal assistance   Additional items Assist x 2;Verbal cues   Stand to Sit 4  Minimal assistance   Additional items Assist x 2;Verbal cues   Additional Comments While pt was standing, pt was cleaned of bowel incontinence and pad changed  Ambulation/Elevation   Gait pattern Short stride  (unsteady)   Gait Assistance 4  Minimal assist   Additional items Assist x 2;Verbal cues; Tactile cues   Assistive Device None  (hand hold A + 2)   Distance 100 feet with change in direction   Balance   Static Sitting Fair   Static Standing Fair -   Dynamic Standing Poor +   Ambulatory Poor +   Activity Tolerance   Activity Tolerance Patient limited by fatigue;Treatment limited secondary to medical complications (Comment)  (cognition)   Exercises   Knee AROM Long Arc Quad AAROM;20 reps;Bilateral   Ankle Pumps AAROM;20 reps;Bilateral   Assessment   Assessment Pt able to trans and amb today with minimal hand hold A + 2  Pt also with good tolerance to bilateral LE ex  Pt will cont to benefit from skilled PT services  Plan   Treatment/Interventions ADL retraining;Functional transfer training;LE strengthening/ROM; Therapeutic exercise; Endurance training;Cognitive reorientation;Patient/family training;Equipment eval/education; Bed mobility;Gait training; Compensatory technique education   PT Frequency 5x/wk   Recommendation   PT Discharge Recommendation [address] License Number  [place], [place] [id number]

## 2020-07-16 NOTE — ASSESSMENT & PLAN NOTE
· Patient with episodes of high peak pressures overnight on 7/11, ? bronchospasms- now improving  · Unclear etiology - but timing suggests there may be a component of drug sensitivity  · Pt with significant tachypnea and wheezing following zosyn administration, abx changed to cefepime/flagyl   · Continue scheduled breathing treatments  · Completed total of 5 days of IV steroids, no more recent wheezing episodes  Will discontinue and switch back to home budesonide nebs

## 2020-07-16 NOTE — UTILIZATION REVIEW
Continued Stay Review    Date: 7/16/20                        Current Patient Class:  IP  Current Level of Care:  Critical Care - Downgrade to Danika Leecatalina 5 7/16    HPI:60 y o  female initially admitted on  7/10/20 with aspiration pneumonia and respiratory failure following a dental cleaning at an outside facility  Assessment/Plan: On O2 @ 2L NC ( s/p extubation 7/15) Intermittent coughing spells overnight but no desats  Last dose Cefepime & Flagyl IV today  Completed total of 5 days of IV steroids, no more recent wheezing episodes  Will discontinue and switch back to home budesonide nebs   Restart home BP meds for sys -150's  Speech recommending  puree/level 1 diet and thin liquids w/ aspiration precautions    Pertinent Labs/Diagnostic Results:   Results from last 7 days   Lab Units 07/16/20  0507 07/14/20  0540 07/13/20  0451 07/12/20  0537 07/11/20  2302   WBC Thousand/uL 6 60 6 44 7 40 7 16 5 76   HEMOGLOBIN g/dL 10 2* 9 0* 9 0* 9 1* 9 8*   HEMATOCRIT % 31 6* 28 2* 27 6* 27 1* 29 4*   PLATELETS Thousands/uL 232 189 162 163 169   NEUTROS ABS Thousands/µL 5 21 5 25 6 24 6 62 3 94     Results from last 7 days   Lab Units 07/16/20  0507 07/15/20  0614 07/14/20  0540 07/13/20  0451 07/12/20  0537 07/11/20  2302 07/11/20  0555   SODIUM mmol/L 139 137 138 137 139 139 134*   POTASSIUM mmol/L 4 5 4 6 4 6 4 4 3 6 3 9 4 4   CHLORIDE mmol/L 104 101 103 102 101 101 100   CO2 mmol/L 32 34* 34* 33* 31 31 29   ANION GAP mmol/L 3* 2* 1* 2* 7 7 5   BUN mg/dL 23 22 18 15 16 17 13   CREATININE mg/dL 0 83 0 87 0 90 0 85 1 28 1 29 0 86   EGFR ml/min/1 73sq m 78 74 71 76 46 46 75   CALCIUM mg/dL 8 7 8 7 8 3 8 0* 8 2* 8 6 8 6   MAGNESIUM mg/dL 2 2 2 3 2 4 2 4 2 2 2 1 2 4   PHOSPHORUS mg/dL 3 5 3 5 3 4  --   --  3 3 4 8*     Results from last 7 days   Lab Units 07/10/20  1150   AST U/L 25   ALT U/L 17   ALK PHOS U/L 47   TOTAL PROTEIN g/dL 6 8   ALBUMIN g/dL 2 6*   TOTAL BILIRUBIN mg/dL 0 30     Results from last 7 days   Lab Units 07/16/20  1159 07/16/20  0511 07/15/20  2356 07/15/20  1722 07/15/20  1204 07/15/20  0607 07/14/20  2327 07/14/20  1733 07/14/20  1151 07/13/20  2342 07/13/20  1752 07/13/20  1141   POC GLUCOSE mg/dl 83 84 86 101 159* 138 127 134 148* 141* 103 114     Results from last 7 days   Lab Units 07/16/20  0507 07/15/20  3364 07/14/20  0540 07/13/20  0451 07/12/20  0537 07/11/20  2302 07/11/20  0555 07/10/20  1150   GLUCOSE RANDOM mg/dL 99 146* 141* 124 136 117 126 122     Results from last 7 days   Lab Units 07/12/20  0120 07/11/20  2311 07/11/20  0545   PH ART  7 323* 7 325* 7 335*   PCO2 ART mm Hg 63 3* 61 0* 59 0*   PO2 ART mm Hg 96 7 75 7 94 3   HCO3 ART mmol/L 32 1* 31 1* 30 8*   BASE EXC ART mmol/L 4 8 3 9 3 6   O2 CONTENT ART mL/dL 14 2* 13 7* 16 3   O2 HGB, ARTERIAL % 96 3 94 2 96 2   ABG SOURCE  Radial, Right Radial, Right Radial, Right     Results from last 7 days   Lab Units 07/11/20  2302   PH TL  7 504*   PCO2 TL mm Hg 35 9*   PO2 TL mm Hg 131 6*   HCO3 TL mmol/L 27 6   BASE EXC TL mmol/L 4 4   O2 CONTENT TL ml/dL 14 5   O2 HGB, VENOUS % 95 6*     Results from last 7 days   Lab Units 07/15/20  0614 07/13/20  0451 07/11/20  0555   PROCALCITONIN ng/ml <0 05 <0 05 <0 05     Results from last 7 days   Lab Units 07/13/20  0451 07/12/20  0537 07/11/20  0555   CRP mg/L 28 3* 25 8* 6 9*     Results from last 7 days   Lab Units 07/11/20  2335   CLARITY UA  Cloudy   COLOR UA  Dark Juliann   SPEC GRAV UA  >=1 030   PH UA  6 5   GLUCOSE UA mg/dl Negative   KETONES UA mg/dl Trace*   BLOOD UA  Large*   PROTEIN UA mg/dl 100 (2+)*   NITRITE UA  Positive*   BILIRUBIN UA  Negative   UROBILINOGEN UA E U /dl 1 0   LEUKOCYTES UA  Small*   WBC UA /hpf Field obscured, unable to enumerate*   RBC UA /hpf Innumerable*   BACTERIA UA /hpf Innumerable*   EPITHELIAL CELLS WET PREP /hpf None Seen     Results from last 7 days   Lab Units 07/12/20  1142 07/11/20  2335 07/10/20  1653   SPUTUM CULTURE  No growth  --  2+ Growth of    GRAM STAIN RESULT  Rare Epithelial cells per low power field*  Rare Polys*  Rare Gram positive cocci in pairs*  --  No Polys or Bacteria seen   URINE CULTURE   --  No Growth <1000 cfu/mL  --      Vital Signs:   07/16/20 1300    78  24Abnormal   142/69  99  99 %               07/16/20 1200  98 °F (36 7 °C)  77  20  160/66  95  98 %    28    2 L/min  Nasal cannula  Sitting   07/16/20 1100    87  24Abnormal   141/72  101  98 %               07/16/20 1000    96  25Abnormal   134/60  96  97 %               07/16/20 0900    92  22  144/71  101  94 %               07/16/20 0800  98 6 °F (37 °C)  85  22  139/66  95  97 %    28    2 L/min  Nasal cannula  Lying   07/16/20 0739            96 %    28    2 L/min  Nasal cannula     07/16/20 0600    69  26Abnormal   125/81  100  95 %               07/16/20 0500    73  22  148/70  101  96 %               07/16/20 0400  98 °F (36 7 °C)  77  20  131/74  97  96 %    28    2 L/min  Nasal cannula  Lying   07/16/20 0300    86  28Abnormal   131/76  96  95 %               07/16/20 0200    71  28Abnormal   136/68  98  99 %               07/16/20 0155            92 %               07/16/20 0100    98  26Abnormal   141/62  89  93 %               07/16/20 0000  97 9 °F (36 6 °C)  69  28Abnormal   152/68  98  95 %    28    2 L/min  Nasal cannula  Lying         Medications:   Scheduled Medications:  Medications:  amLODIPine 5 mg Per NG Tube Daily   atorvastatin 10 mg Per NG Tube Daily With Dinner   budesonide 0 5 mg Nebulization BID   enoxaparin 40 mg Subcutaneous Q24H ALEJANDRO   famotidine 20 mg Intravenous Q24H ALEJANDRO   losartan 50 mg Oral Daily   And      hydrochlorothiazide 12 5 mg Oral Daily   insulin lispro 1-5 Units Subcutaneous Q6H [facility]   ipratropium-albuterol 3 mL Nebulization Q6H   levothyroxine 75 mcg Oral Early Morning   risperiDONE 2 mg Oral BID   valproic acid 250 mg Oral Q12H [facility]   white petrolatum-mineral oil  Topical TID     Continuous IV Infusions:     PRN Meds:  acetaminophen 650 mg Oral Q6H PRN   albuterol 2 5 mg Nebulization Q4H PRN   hydrocodone-chlorpheniramine polistirex 5 mL Oral Q12H PRN       Discharge Plan: PT recommending Post Acute Rehab    Network Utilization Review Department  [email or web address]  ATTENTION: Please call with any questions or concerns to [phone] and carefully listen to the prompts so that you are directed to the right person  All voicemails are confidential   Arlen Patel all requests for admission clinical reviews, approved or denied determinations and any other requests to dedicated fax number below belonging to the campus where the patient is receiving treatment   List of dedicated fax numbers for the Facilities:  [address] DENIALS (Administrative/Medical Necessity) [phone]   [address] (Maternity/NICU/Pediatrics) [phone]   Farida Gama [phone]   Starr Chisholm [phone]   Salazar Steen [phone]   Gerri Nyhan [phone]   [facility] [address] [phone]   [facility]  [phone]   [address] [facility] [address]  [address] [facility] [address] [facility] [phone]

## 2020-07-16 NOTE — PLAN OF CARE
Summary   Pt presented with s/s suggestive of moderate oral and suspected mild pharyngeal dysphagia  Symptoms or concerns included decreased mastication and suspected decreased control of foods  cough with textured foods         Risk/s for Aspiration: present     Recommended Diet: puree/level 1 diet and thin liquids   Recommended Form of Meds: as tolerated   Aspiration precautions and swallowing strategies: upright posture, only feed when fully alert and slow rate of feeding

## 2020-07-16 NOTE — ASSESSMENT & PLAN NOTE
· Patient's BP stable on arrival  · Hold home norvasc for now given soft BP - likely 2/2 sedation   · Resume when able   · 7/16: BP has been stable with occasional higher readings in systolic 401-185T  Will restart home medication irbersartan/HCTZ and amlodipine

## 2020-07-16 NOTE — ASSESSMENT & PLAN NOTE
· Patient with history of interstitial lung disease  No pulmonary records to review  Family asked to attempt to obtain records from pulmonologist  · CT chest: Lungs suboptimally evaluated due to respiratory motion with honeycombing at the periphery of the left lung due to pulmonary fibrosis  Left greater than right upper lobe consolidation, dependent in the left upper lobe  The dependent location suggest aspiration  · Completed 5 days of IV steroids for suspected bronchospasm     · Continue scheduled nebs

## 2020-07-16 NOTE — ASSESSMENT & PLAN NOTE
· ? Aspiration during dental procedure  · Gastric distention noted on admission CT scan which could have contributed to possible aspiration  · Day 6 antibiotics - cefepime/flagyl  · WBC remains normal  · Sputum culture pending, preliminarily with no bacteria  · Repeat sputum culture sent 7/12 --> Rare Epithelial cells per low power field, rare Polys, rare Gram positive cocci in pairs  · Will complete 7 day course of cefepime/flagyl today

## 2020-07-16 NOTE — SOCIAL WORK
LOS - 6 days    SW following to assist with planning  Case discussed with ICU PA, Jenifer Pedersen  Pt's condition improving  Started therapy today and STR placement is being recommended  SW placed call to , Yumiko Escobar  Reviewed pt's PT/OT progress with her and discussed rehab recommendation  Ms Jennifer Staples feels rehab placement would be beneficial prior to return to group home  Emailed list of facilities near the group home to Ms Jennifer Staples for her to consider  SW will assist with making referrals as soon as facility choices are provided  SW will continue to follow to monitor progress and assist with planning as needed

## 2020-07-17 PROCEDURE — 94640 AIRWAY INHALATION TREATMENT: CPT

## 2020-07-17 PROCEDURE — 94760 N-INVAS EAR/PLS OXIMETRY 1: CPT

## 2020-07-17 PROCEDURE — 99232 SBSQ HOSP IP/OBS MODERATE 35: CPT | Performed by: NURSE PRACTITIONER

## 2020-07-17 RX ORDER — IPRATROPIUM BROMIDE AND ALBUTEROL SULFATE 2.5; .5 MG/3ML; MG/3ML
3 SOLUTION RESPIRATORY (INHALATION)
Status: DISCONTINUED | OUTPATIENT
Start: 2020-07-17 | End: 2020-07-18 | Stop reason: HOSPADM

## 2020-07-17 RX ORDER — FLUTICASONE FUROATE AND VILANTEROL 200; 25 UG/1; UG/1
1 POWDER RESPIRATORY (INHALATION)
Status: DISCONTINUED | OUTPATIENT
Start: 2020-07-17 | End: 2020-07-17 | Stop reason: ALTCHOICE

## 2020-07-17 RX ORDER — LEVALBUTEROL INHALATION SOLUTION 0.63 MG/3ML
0.63 SOLUTION RESPIRATORY (INHALATION) EVERY 4 HOURS PRN
Status: DISCONTINUED | OUTPATIENT
Start: 2020-07-17 | End: 2020-07-18 | Stop reason: HOSPADM

## 2020-07-17 RX ADMIN — BUDESONIDE 0.5 MG: 0.5 INHALANT RESPIRATORY (INHALATION) at 19:41

## 2020-07-17 RX ADMIN — AMLODIPINE BESYLATE 5 MG: 5 TABLET ORAL at 09:04

## 2020-07-17 RX ADMIN — VALPROIC ACID 250 MG: 250 SOLUTION ORAL at 21:39

## 2020-07-17 RX ADMIN — HYDROCHLOROTHIAZIDE 12.5 MG: 12.5 TABLET ORAL at 09:04

## 2020-07-17 RX ADMIN — VALPROIC ACID 250 MG: 250 SOLUTION ORAL at 09:09

## 2020-07-17 RX ADMIN — ATORVASTATIN CALCIUM 10 MG: 10 TABLET, FILM COATED ORAL at 16:31

## 2020-07-17 RX ADMIN — LEVOTHYROXINE SODIUM 75 MCG: 75 TABLET ORAL at 05:27

## 2020-07-17 RX ADMIN — RISPERIDONE 2 MG: 1 TABLET ORAL at 21:40

## 2020-07-17 RX ADMIN — IPRATROPIUM BROMIDE AND ALBUTEROL SULFATE 3 ML: 2.5; .5 SOLUTION RESPIRATORY (INHALATION) at 14:04

## 2020-07-17 RX ADMIN — Medication: at 16:31

## 2020-07-17 RX ADMIN — IPRATROPIUM BROMIDE AND ALBUTEROL SULFATE 3 ML: 2.5; .5 SOLUTION RESPIRATORY (INHALATION) at 07:54

## 2020-07-17 RX ADMIN — IPRATROPIUM BROMIDE AND ALBUTEROL SULFATE 3 ML: 2.5; .5 SOLUTION RESPIRATORY (INHALATION) at 19:41

## 2020-07-17 RX ADMIN — Medication: at 21:40

## 2020-07-17 RX ADMIN — BUDESONIDE 0.5 MG: 0.5 INHALANT RESPIRATORY (INHALATION) at 07:54

## 2020-07-17 RX ADMIN — RISPERIDONE 2 MG: 1 TABLET ORAL at 09:30

## 2020-07-17 RX ADMIN — LOSARTAN POTASSIUM 50 MG: 50 TABLET, FILM COATED ORAL at 09:04

## 2020-07-17 RX ADMIN — ENOXAPARIN SODIUM 40 MG: 40 INJECTION SUBCUTANEOUS at 09:04

## 2020-07-17 RX ADMIN — IPRATROPIUM BROMIDE AND ALBUTEROL SULFATE 3 ML: 2.5; .5 SOLUTION RESPIRATORY (INHALATION) at 01:50

## 2020-07-17 RX ADMIN — Medication: at 14:36

## 2020-07-17 NOTE — ASSESSMENT & PLAN NOTE
Resolved  Patient intubated prior to dental cleaning  Extubated postprocedure but had increased work of breathing and therefore intubated  Likely secondary to history of pulmonary fibrosis with possible aspiration pneumonia/pneumonitis  Patient uses oxygen 1 L via nasal cannula at baseline  ABG on arrival showed pH 7 338, pCO2 64  Likely component of chronic hypercapnia  Received 6 days of antibiotic total   Extubated on 7/15  On oxygen 2 L via nasal cannula currently, satting high 90s  Decrease DuoNeb t i d  And will order Xopenex p r n

## 2020-07-17 NOTE — ASSESSMENT & PLAN NOTE
Questionable bronchospasm while on ventilator in ICU  Suspect secondary to IV Zosyn  Patient with significant tachycardia and wheezing following Zosyn administration, was changed to cefepime/Flagyl in ICU  Received total 5 days of IV steroids in ICU  No wheezing on auscultation today  Continue budesonide nebs b i d

## 2020-07-17 NOTE — PROGRESS NOTES
Progress Note - Norma Beckfordamento 1960, 64 y o  female MRN: [id number]    Unit/Bed#: ICU 07 Encounter: [id number]    Primary Care Provider: No primary care provider on file  Date and time admitted to hospital: 7/10/2020 11:18 AM        * Aspiration pneumonia [facility])  Assessment & Plan  Questionable aspiration during dental procedure  Gastric distension noted on CT scan on admission  Received 6 days total of IV cefepime/Flagyl + Zosyn  Procalcitonin negative x3  Sputum culture no growth  Patient afebrile, satting high 90s on O2 2 L  Tentative discharge to [facility] in AM          Acute on chronic respiratory failure with hypercapnia (HCC)  Assessment & Plan  Resolved  Patient intubated prior to dental cleaning  Extubated postprocedure but had increased work of breathing and therefore intubated  Likely secondary to history of pulmonary fibrosis with possible aspiration pneumonia/pneumonitis  Patient uses oxygen 1 L via nasal cannula at baseline  ABG on arrival showed pH 7 338, pCO2 64  Likely component of chronic hypercapnia  Received 6 days of antibiotic total   Extubated on 7/15  On oxygen 2 L via nasal cannula currently, satting high 90s  Decrease DuoNeb t i d  And will order Xopenex p r n  Bronchospasm  Assessment & Plan  Questionable bronchospasm while on ventilator in ICU  Suspect secondary to IV Zosyn  Patient with significant tachycardia and wheezing following Zosyn administration, was changed to cefepime/Flagyl in ICU  Received total 5 days of IV steroids in ICU  No wheezing on auscultation today  Continue budesonide nebs b i d  Hypertension  Assessment & Plan  BP stable  Resumed Norvasc, HCTZ, losartan      Interstitial lung disease ([facility] [address] )  Assessment & Plan  History of pulmonary fibrosis  Resumed Pulmicort b i d  Will resume Advair(substitute with Breo)      Disease of thyroid gland  Assessment & Plan  Continue Synthroid    Hyperlipidemia  Assessment & Plan  Continue Lipitor    Schizo affective schizophrenia ([facility] [address] )  Assessment & Plan  Continue Risperdal and Depakote    Intellectual disability  Assessment & Plan  Patient is from group home  VTE Pharmacologic Prophylaxis:   Pharmacologic: Enoxaparin (Lovenox)  Mechanical VTE Prophylaxis in Place: Yes    Patient Centered Rounds: I have performed bedside rounds with nursing staff today  Discussions with Specialists or Other Care Team Provider: yes    Education and Discussions with Family / Patient: yes    Time Spent for Care: 20 minutes  More than 50% of total time spent on counseling and coordination of care as described above  Current Length of Stay: 7 day(s)    Current Patient Status: Inpatient   Certification Statement: The patient will continue to require additional inpatient hospital stay due to Aspiration pneumonia    Discharge Plan:  Short-term rehab in 24 hours    Code Status: Level 1 - Full Code      Subjective:   Patient denies SOB  Reports cough  Reports mild left-sided abdominal pain  Patient offered no other complaints    Objective:     Vitals:   Temp (24hrs), Av 9 °F (36 6 °C), Min:97 8 °F (36 6 °C), Max:97 9 °F (36 6 °C)    Temp:  [97 8 °F (36 6 °C)-97 9 °F (36 6 °C)] 97 8 °F (36 6 °C)  HR:  [70-98] 98  Resp:  [20] 20  BP: (133-153)/(65-68) 133/65  SpO2:  [97 %-99 %] 99 %  Body mass index is 28 59 kg/m²  Input and Output Summary (last 24 hours): Intake/Output Summary (Last 24 hours) at 2020 1836  Last data filed at 2020  Gross per 24 hour   Intake 10 ml   Output    Net 10 ml       Physical Exam:     Physical Exam   Constitutional: She appears well-developed and well-nourished  HENT:   Head: Normocephalic and atraumatic  Neck: Normal range of motion  Neck supple  No JVD present  No tracheal deviation present  No thyromegaly present  Cardiovascular: Normal rate and regular rhythm  No murmur heard  Pulmonary/Chest: Effort normal and breath sounds normal  No respiratory distress  She has no wheezes  She has no rales  Diminished breath in left lower lobe,on O2 2 L, satting mid 90s  Abdominal: Soft  Bowel sounds are normal  She exhibits no distension  There is no tenderness  There is no guarding  Musculoskeletal: She exhibits no edema, tenderness or deformity  Neurological: She is alert  Oriented to place and person, follows commands  Soft voice  Skin: Skin is warm and dry  Psychiatric: She has a normal mood and affect  Judgment normal    Nursing note and vitals reviewed  Additional Data:     Labs:    Results from last 7 days   Lab Units 07/16/20  0507   WBC Thousand/uL 6 60   HEMOGLOBIN g/dL 10 2*   HEMATOCRIT % 31 6*   PLATELETS Thousands/uL 232   NEUTROS PCT % 80*   LYMPHS PCT % 11*   MONOS PCT % 9   EOS PCT % 0     Results from last 7 days   Lab Units 07/16/20  0507   POTASSIUM mmol/L 4 5   CHLORIDE mmol/L 104   CO2 mmol/L 32   BUN mg/dL 23   CREATININE mg/dL 0 83   CALCIUM mg/dL 8 7           * I Have Reviewed All Lab Data Listed Above  * Additional Pertinent Lab Tests Reviewed:  [address] Admission Reviewed    Imaging:    Imaging Reports Reviewed Today Include:  CT chest  Imaging Personally Reviewed by Myself Includes:  None    Recent Cultures (last 7 days):     Results from last 7 days   Lab Units 07/12/20  1142 07/11/20  2335   SPUTUM CULTURE  No growth  --    GRAM STAIN RESULT  Rare Epithelial cells per low power field*  Rare Polys*  Rare Gram positive cocci in pairs*  --    URINE CULTURE   --  No Growth <1000 cfu/mL       Last 24 Hours Medication List:     Current Facility-Administered Medications:  acetaminophen 650 mg Oral Q6H PRN Pooja Martin MD   albuterol 2 5 mg Nebulization Q4H PRN Pooja Martin MD   amLODIPine 5 mg Per NG Tube Daily Pooja Martin MD   atorvastatin 10 mg Per NG Tube Daily With Dinner Pooja Martin MD   budesonide 0 5 mg Nebulization BID Pooja Martin MD   enoxaparin 40 mg Subcutaneous Q24H Manjit Castillo MD fluticasone-vilanterol 1 puff Inhalation Daily MEME Cisneros   losartan 50 mg Oral Daily Andree Reece MD   And       hydrochlorothiazide 12 5 mg Oral Daily Andree Reece, MD   hydrocodone-chlorpheniramine polistirex 5 mL Oral Q12H PRN Andree Reece, MD   ipratropium-albuterol 3 mL Nebulization TID MEME Cisneros   levalbuterol 0 63 mg Nebulization Q4H PRN MEME Cisneros   levothyroxine 75 mcg Oral Early Morning Andree Reece, MD   risperiDONE 2 mg Oral BID Andree Males, MD   valproic acid 250 mg Oral Q12H Wm Amaya MD   white petrolatum-mineral oil  Topical TID Andree Reece MD        Today, Patient Was Seen By: MEME Marshall    ** Please Note: Dragon 360 Dictation voice to text software may have been used in the creation of this document   **

## 2020-07-17 NOTE — PLAN OF CARE
Problem: PAIN - ADULT  Goal: Verbalizes/displays adequate comfort level or baseline comfort level  Description  Interventions:  - Encourage patient to monitor pain and request assistance  - Assess pain using appropriate pain scale  - Administer analgesics based on type and severity of pain and evaluate response  - Implement non-pharmacological measures as appropriate and evaluate response  - Consider cultural and social influences on pain and pain management  - Notify physician/advanced practitioner if interventions unsuccessful or patient reports new pain  Outcome: Progressing     Problem: SAFETY ADULT  Goal: Patient will remain free of falls  Description  INTERVENTIONS:  - Assess patient frequently for physical needs  -  Identify cognitive and physical deficits and behaviors that affect risk of falls    -  [place] fall precautions as indicated by assessment   - Educate patient/family on patient safety including physical limitations  - Instruct patient to call for assistance with activity based on assessment  - Modify environment to reduce risk of injury  - Consider OT/PT consult to assist with strengthening/mobility  Outcome: Progressing     Problem: DISCHARGE PLANNING  Goal: Discharge to home or other facility with appropriate resources  Description  INTERVENTIONS:  - Identify barriers to discharge w/patient and caregiver  - Arrange for needed discharge resources and transportation as appropriate  - Identify discharge learning needs (meds, wound care, etc )  - Arrange for interpretive services to assist at discharge as needed  - Refer to Case Management Department for coordinating discharge planning if the patient needs post-hospital services based on physician/advanced practitioner order or complex needs related to functional status, cognitive ability, or social support system  Outcome: Progressing     Problem: Knowledge Deficit  Goal: Patient/family/caregiver demonstrates understanding of disease process, treatment plan, medications, and discharge instructions  Description  Complete learning assessment and assess knowledge base    Interventions:  - Provide teaching at level of understanding  - Provide teaching via preferred learning methods  Outcome: Progressing     Problem: RESPIRATORY - ADULT  Goal: Achieves optimal ventilation and oxygenation  Description  INTERVENTIONS:  - Assess for changes in respiratory status  - Assess for changes in mentation and behavior  - Position to facilitate oxygenation and minimize respiratory effort  - Oxygen administered by appropriate delivery if ordered  - Initiate smoking cessation education as indicated  - Encourage broncho-pulmonary hygiene including cough, deep breathe, Incentive Spirometry  - Assess the need for suctioning and aspirate as needed  - Assess and instruct to report SOB or any respiratory difficulty  - Respiratory Therapy support as indicated  Outcome: Progressing     Problem: GENITOURINARY - ADULT  Goal: Maintains or returns to baseline urinary function  Description  INTERVENTIONS:  - Assess urinary function  - Encourage oral fluids to ensure adequate hydration if ordered  - Administer IV fluids as ordered to ensure adequate hydration  - Administer ordered medications as needed  - Offer frequent toileting  - Follow urinary retention protocol if ordered  Outcome: Progressing     Problem: Prexisting or High Potential for Compromised Skin Integrity  Goal: Skin integrity is maintained or improved  Description  INTERVENTIONS:  - Identify patients at risk for skin breakdown  - Assess and monitor skin integrity  - Assess and monitor nutrition and hydration status  - Monitor labs   - Assess for incontinence   - Turn and reposition patient  - Assist with mobility/ambulation  - Relieve pressure over bony prominences  - Avoid friction and shearing  - Provide appropriate hygiene as needed including keeping skin clean and dry  - Evaluate need for skin moisturizer/barrier cream  - Collaborate with interdisciplinary team   - Patient/family teaching  - Consider wound care consult   Outcome: Progressing     Problem: Potential for Falls  Goal: Patient will remain free of falls  Description  INTERVENTIONS:  - Assess patient frequently for physical needs  -  Identify cognitive and physical deficits and behaviors that affect risk of falls  -  [place] fall precautions as indicated by assessment   - Educate patient/family on patient safety including physical limitations  - Instruct patient to call for assistance with activity based on assessment  - Modify environment to reduce risk of injury  - Consider OT/PT consult to assist with strengthening/mobility  Outcome: Progressing     Problem: Nutrition/Hydration-ADULT  Goal: Nutrient/Hydration intake appropriate for improving, restoring or maintaining nutritional needs  Description  Monitor and assess patient's nutrition/hydration status for malnutrition  Collaborate with interdisciplinary team and initiate plan and interventions as ordered  Monitor patient's weight and dietary intake as ordered or per policy  Utilize nutrition screening tool and intervene as necessary  Determine patient's food preferences and provide high-protein, high-caloric foods as appropriate       INTERVENTIONS:  - Monitor oral intake, urinary output, labs, and treatment plans  - Assess nutrition and hydration status and recommend course of action  - Evaluate amount of meals eaten  - Assist patient with eating if necessary   - Allow adequate time for meals  - Recommend/ encourage appropriate diets, oral nutritional supplements, and vitamin/mineral supplements  - - Provide specific nutrition/hydration education as appropriate  - Include patient/family/caregiver in decisions related to nutrition  Outcome: Progressing

## 2020-07-17 NOTE — SOCIAL WORK
LOS - 7 days    SW following to assist with DCP  STR placement is planned  SW received facility choices from Steph schwartz  Referrals were made to [address] [facility] and [address] at [facility] and [address] at [facility]  SW spoke with Steph schwartz, , as well as Ivania Tellez, [facility] [place], about plans  SW will continue to follow to monitor progress and assist with planning as needed

## 2020-07-17 NOTE — PHYSICAL THERAPY NOTE
Patient adamantly deferred PT services at this time stating, "I feel sick!!" Will re-attempt at a later time as patient agreeable     Ursula Delcid [id number]

## 2020-07-17 NOTE — ASSESSMENT & PLAN NOTE
Questionable aspiration during dental procedure  Gastric distension noted on CT scan on admission  Received 6 days total of IV cefepime/Flagyl + Zosyn  Procalcitonin negative x3  Sputum culture no growth  Patient afebrile, satting high 90s on O2 2 L    Tentative discharge to [facility] in AM

## 2020-07-18 ENCOUNTER — APPOINTMENT (INPATIENT)
Dept: RADIOLOGY | Facility: HOSPITAL | Age: 60
DRG: 207 | End: 2020-07-18
Payer: MEDICARE

## 2020-07-18 VITALS
RESPIRATION RATE: 18 BRPM | OXYGEN SATURATION: 98 % | WEIGHT: 133.38 LBS | DIASTOLIC BLOOD PRESSURE: 68 MMHG | HEART RATE: 80 BPM | HEIGHT: 60 IN | BODY MASS INDEX: 26.19 KG/M2 | SYSTOLIC BLOOD PRESSURE: 138 MMHG | TEMPERATURE: 99.1 F

## 2020-07-18 LAB
ANION GAP SERPL CALCULATED.3IONS-SCNC: 3 MMOL/L (ref 4–13)
BUN SERPL-MCNC: 10 MG/DL (ref 5–25)
CALCIUM SERPL-MCNC: 8.8 MG/DL (ref 8.3–10.1)
CHLORIDE SERPL-SCNC: 100 MMOL/L (ref 100–108)
CO2 SERPL-SCNC: 34 MMOL/L (ref 21–32)
CREAT SERPL-MCNC: 0.79 MG/DL (ref 0.6–1.3)
GFR SERPL CREATININE-BSD FRML MDRD: 83 ML/MIN/1.73SQ M
GLUCOSE SERPL-MCNC: 89 MG/DL (ref 65–140)
MAGNESIUM SERPL-MCNC: 1.8 MG/DL (ref 1.6–2.6)
POTASSIUM SERPL-SCNC: 3.9 MMOL/L (ref 3.5–5.3)
SODIUM SERPL-SCNC: 137 MMOL/L (ref 136–145)

## 2020-07-18 PROCEDURE — 99239 HOSP IP/OBS DSCHRG MGMT >30: CPT | Performed by: NURSE PRACTITIONER

## 2020-07-18 PROCEDURE — 94640 AIRWAY INHALATION TREATMENT: CPT

## 2020-07-18 PROCEDURE — 80048 BASIC METABOLIC PNL TOTAL CA: CPT | Performed by: NURSE PRACTITIONER

## 2020-07-18 PROCEDURE — 94760 N-INVAS EAR/PLS OXIMETRY 1: CPT

## 2020-07-18 PROCEDURE — 83735 ASSAY OF MAGNESIUM: CPT | Performed by: NURSE PRACTITIONER

## 2020-07-18 PROCEDURE — 71045 X-RAY EXAM CHEST 1 VIEW: CPT

## 2020-07-18 RX ORDER — DIVALPROEX SODIUM 250 MG/1
500 TABLET, DELAYED RELEASE ORAL
Refills: 0
Start: 2020-07-18

## 2020-07-18 RX ORDER — MAGNESIUM SULFATE 1 G/100ML
1 INJECTION INTRAVENOUS ONCE
Status: COMPLETED | OUTPATIENT
Start: 2020-07-18 | End: 2020-07-18

## 2020-07-18 RX ORDER — ALBUTEROL SULFATE 2.5 MG/3ML
SOLUTION RESPIRATORY (INHALATION)
Refills: 0
Start: 2020-07-18

## 2020-07-18 RX ORDER — RISPERIDONE 2 MG/1
2 TABLET, FILM COATED ORAL
COMMUNITY

## 2020-07-18 RX ORDER — DIVALPROEX SODIUM 250 MG/1
250 TABLET, DELAYED RELEASE ORAL DAILY
Refills: 0
Start: 2020-07-18

## 2020-07-18 RX ORDER — HYDROCODONE POLISTIREX AND CHLORPHENIRAMINE POLISTIREX 10; 8 MG/5ML; MG/5ML
5 SUSPENSION, EXTENDED RELEASE ORAL EVERY 12 HOURS PRN
Qty: 70 ML | Refills: 0 | Status: SHIPPED | OUTPATIENT
Start: 2020-07-18 | End: 2020-07-25

## 2020-07-18 RX ORDER — POTASSIUM CHLORIDE 20 MEQ/1
20 TABLET, EXTENDED RELEASE ORAL ONCE
Status: COMPLETED | OUTPATIENT
Start: 2020-07-18 | End: 2020-07-18

## 2020-07-18 RX ADMIN — HYDROCHLOROTHIAZIDE 12.5 MG: 12.5 TABLET ORAL at 10:51

## 2020-07-18 RX ADMIN — MAGNESIUM SULFATE HEPTAHYDRATE 1 G: 1 INJECTION, SOLUTION INTRAVENOUS at 12:58

## 2020-07-18 RX ADMIN — IPRATROPIUM BROMIDE AND ALBUTEROL SULFATE 3 ML: 2.5; .5 SOLUTION RESPIRATORY (INHALATION) at 07:40

## 2020-07-18 RX ADMIN — ENOXAPARIN SODIUM 40 MG: 40 INJECTION SUBCUTANEOUS at 10:46

## 2020-07-18 RX ADMIN — POTASSIUM CHLORIDE 20 MEQ: 1500 TABLET, EXTENDED RELEASE ORAL at 13:02

## 2020-07-18 RX ADMIN — RISPERIDONE 2 MG: 1 TABLET ORAL at 10:47

## 2020-07-18 RX ADMIN — Medication: at 10:47

## 2020-07-18 RX ADMIN — LOSARTAN POTASSIUM 50 MG: 50 TABLET, FILM COATED ORAL at 10:51

## 2020-07-18 RX ADMIN — AMLODIPINE BESYLATE 5 MG: 5 TABLET ORAL at 10:51

## 2020-07-18 RX ADMIN — HYDROCODONE POLISTIREX AND CHLORPHENIRAMINE POLISTIREX 5 ML: 10; 8 SUSPENSION, EXTENDED RELEASE ORAL at 10:55

## 2020-07-18 RX ADMIN — BUDESONIDE 0.5 MG: 0.5 INHALANT RESPIRATORY (INHALATION) at 07:40

## 2020-07-18 RX ADMIN — VALPROIC ACID 250 MG: 250 SOLUTION ORAL at 10:46

## 2020-07-18 RX ADMIN — LEVOTHYROXINE SODIUM 75 MCG: 75 TABLET ORAL at 06:17

## 2020-07-18 NOTE — [place] UNIVERSAL TRANSFER FORM
[place] UNIVERSAL TRANSFER FORM  (ALL ITEMS MUST BE COMPLETED)    1  TRANSFER FROM: [address] [facility]      TRANSFER TO: [address] [place] [address]  2  DATE OF TRANSFER: 7/18/2020                        TIME OF TRANSFER: 1600  3  PATIENT NAME: Mireya Adams,        YOB: 1960                             GENDER: female    4  LANGUAGE:   English    5  PHYSICIAN NAME:  César Mir MD                   PHONE: [phone]    6  CODE STATUS: Level 1 - Full Code        Out of Hospital DNR Attached: No    7  :                                      :  Extended Emergency Contact Information  Primary Emergency Contact: Edna Mary  Work Phone: [phone]  Relation: Care Giver  Secondary Emergency Contact: Mariela Rivero  [place] Phone: [phone]  Relation: Niece           Health Care Representative/Proxy:  Yes           Legal Guardian:  No             NAME OF:           HEALTH CARE REPRESENTATIVE/PROXY:                                         OR           LEGAL GUARDIAN, IF NOT :                                               PHONE:  (Day)           (Night)                        (Cell)    8  REASON FOR TRANSFER: (Must include brief medical history and recent changes in physical function or cognition ) Rehabilitation            V/S: /68 (BP Location: Left arm)   Pulse 80   Temp 99 1 °F (37 3 °C) (Oral)   Resp 18   Ht 5' (1 524 m)   Wt 60 5 kg (133 lb 6 1 oz)   SpO2 98%   BMI 26 05 kg/m²           PAIN: None    9  PRIMARY DIAGNOSIS: Aspiration pneumonia ([address] )      Secondary Diagnosis:         Pacemaker: Yes      Internal Defib: No          Mental Health Diagnosis (if Applicable):    10  RESTRAINTS: No     11  RESPIRATORY NEEDS: Oxygen Device nasal cannula, and Flow rate: 2L/min    12  ISOLATION/PRECAUTION: None    13  ALLERGY: Patient has no known allergies      14  SENSORY:       Vision Good, Hearing Good  and Speech Clear    15  SKIN CONDITION: No Wounds    16  DIET: Special (describe)Dyspahgia, Puree, thin Liquid    17  IV ACCESS: None    18  PERSONAL ITEMS SENT WITH PATIENT: None    19  ATTACHED DOCUMENTS: MUST ATTACH CURRENT MEDICATION INFORMATION Face Sheet, MAR, Diagnostic Studies, Labs, Respiratory Care, Discharge Summary, PT Note, OT Note, ST Note and HX/PE    20  AT RISK ALERTS:Falls and Aspiration        HARM TO: N/A    21  WEIGHT BEARING STATUS:         Left Leg: Full        Right Leg: Full    22  MENTAL STATUS:Alert and Forgetful    23  FUNCTION:        Walk: With Help        Transfer: With Help        Toilet: With Help        Feed: Self    24  IMMUNIZATIONS/SCREENING:     There is no immunization history on file for this patient  25  BOWEL: Incontinent  and Date Last BM7/18/20    26  BLADDER: Incontinent    27   SENDING FACILITY CONTACT: [address] [place] [address]                  Title: Medical Surgical      Unit: [address]      Phone: [phone] [address] (if known):        Title:        Unit:         Phone:         FORM PREFILLED BY (if applicable)       Title:       Unit:        Phone:         FORM COMPLETED BY Areli Beasley RN      Title: Registered Nurse    Phone: [phone]

## 2020-07-18 NOTE — PLAN OF CARE
Problem: PAIN - ADULT  Goal: Verbalizes/displays adequate comfort level or baseline comfort level  Description  Interventions:  - Encourage patient to monitor pain and request assistance  - Assess pain using appropriate pain scale  - Administer analgesics based on type and severity of pain and evaluate response  - Implement non-pharmacological measures as appropriate and evaluate response  - Consider cultural and social influences on pain and pain management  - Notify physician/advanced practitioner if interventions unsuccessful or patient reports new pain  Outcome: Progressing     Problem: SAFETY ADULT  Goal: Patient will remain free of falls  Description  INTERVENTIONS:  - Assess patient frequently for physical needs  -  Identify cognitive and physical deficits and behaviors that affect risk of falls    -  [place] fall precautions as indicated by assessment   - Educate patient/family on patient safety including physical limitations  - Instruct patient to call for assistance with activity based on assessment  - Modify environment to reduce risk of injury  - Consider OT/PT consult to assist with strengthening/mobility  Outcome: Progressing     Problem: DISCHARGE PLANNING  Goal: Discharge to home or other facility with appropriate resources  Description  INTERVENTIONS:  - Identify barriers to discharge w/patient and caregiver  - Arrange for needed discharge resources and transportation as appropriate  - Identify discharge learning needs (meds, wound care, etc )  - Arrange for interpretive services to assist at discharge as needed  - Refer to Case Management Department for coordinating discharge planning if the patient needs post-hospital services based on physician/advanced practitioner order or complex needs related to functional status, cognitive ability, or social support system  Outcome: Progressing     Problem: Knowledge Deficit  Goal: Patient/family/caregiver demonstrates understanding of disease process, treatment plan, medications, and discharge instructions  Description  Complete learning assessment and assess knowledge base    Interventions:  - Provide teaching at level of understanding  - Provide teaching via preferred learning methods  Outcome: Progressing     Problem: RESPIRATORY - ADULT  Goal: Achieves optimal ventilation and oxygenation  Description  INTERVENTIONS:  - Assess for changes in respiratory status  - Assess for changes in mentation and behavior  - Position to facilitate oxygenation and minimize respiratory effort  - Oxygen administered by appropriate delivery if ordered  - Initiate smoking cessation education as indicated  - Encourage broncho-pulmonary hygiene including cough, deep breathe, Incentive Spirometry  - Assess the need for suctioning and aspirate as needed  - Assess and instruct to report SOB or any respiratory difficulty  - Respiratory Therapy support as indicated  Outcome: Progressing     Problem: GENITOURINARY - ADULT  Goal: Maintains or returns to baseline urinary function  Description  INTERVENTIONS:  - Assess urinary function  - Encourage oral fluids to ensure adequate hydration if ordered  - Administer IV fluids as ordered to ensure adequate hydration  - Administer ordered medications as needed  - Offer frequent toileting  - Follow urinary retention protocol if ordered  Outcome: Progressing     Problem: Prexisting or High Potential for Compromised Skin Integrity  Goal: Skin integrity is maintained or improved  Description  INTERVENTIONS:  - Identify patients at risk for skin breakdown  - Assess and monitor skin integrity  - Assess and monitor nutrition and hydration status  - Monitor labs   - Assess for incontinence   - Turn and reposition patient  - Assist with mobility/ambulation  - Relieve pressure over bony prominences  - Avoid friction and shearing  - Provide appropriate hygiene as needed including keeping skin clean and dry  - Evaluate need for skin moisturizer/barrier cream  - Collaborate with interdisciplinary team   - Patient/family teaching  - Consider wound care consult   Outcome: Progressing     Problem: Potential for Falls  Goal: Patient will remain free of falls  Description  INTERVENTIONS:  - Assess patient frequently for physical needs  -  Identify cognitive and physical deficits and behaviors that affect risk of falls  -  [place] fall precautions as indicated by assessment   - Educate patient/family on patient safety including physical limitations  - Instruct patient to call for assistance with activity based on assessment  - Modify environment to reduce risk of injury  - Consider OT/PT consult to assist with strengthening/mobility  Outcome: Progressing     Problem: Nutrition/Hydration-ADULT  Goal: Nutrient/Hydration intake appropriate for improving, restoring or maintaining nutritional needs  Description  Monitor and assess patient's nutrition/hydration status for malnutrition  Collaborate with interdisciplinary team and initiate plan and interventions as ordered  Monitor patient's weight and dietary intake as ordered or per policy  Utilize nutrition screening tool and intervene as necessary  Determine patient's food preferences and provide high-protein, high-caloric foods as appropriate       INTERVENTIONS:  - Monitor oral intake, urinary output, labs, and treatment plans  - Assess nutrition and hydration status and recommend course of action  - Evaluate amount of meals eaten  - Assist patient with eating if necessary   - Allow adequate time for meals  - Recommend/ encourage appropriate diets, oral nutritional supplements, and vitamin/mineral supplements  - Order, calculate, and assess calorie counts as needed  - Recommend, monitor, and adjust tube feedings and TPN/PPN based on assessed needs  - Assess need for intravenous fluids  - Provide specific nutrition/hydration education as appropriate  - Include patient/family/caregiver in decisions related to nutrition  Outcome: Progressing

## 2020-07-18 NOTE — ASSESSMENT & PLAN NOTE
Resolved  Patient intubated prior to dental cleaning  Extubated postprocedure but had increased work of breathing and therefore intubated  Likely secondary to history of pulmonary fibrosis with possible aspiration pneumonia/pneumonitis  Patient uses oxygen 1 L via nasal cannula at baseline  ABG on arrival showed pH 7 338, pCO2 64  Likely component of chronic hypercapnia  Received 6 days of antibiotic total   Extubated on 7/15  On oxygen 2 L via nasal cannula currently, satting high 90s  Decreased to 1 L via NC  Change DuoNeb t i d  To albuterol neb t i d and q 4 hours p r n  On discharge  Resume Incruse Ellipta on discharge

## 2020-07-18 NOTE — DISCHARGE INSTRUCTIONS
Continue albuterol neb t i d     Repeat chest x-ray in 1 week    Continue incentive spirometer in rehab  Patient is on oxygen 1 L via nasal cannula continuously at baseline  Needs speech eval on Monday prior to advance diet  Patient was on regular diet a group home

## 2020-07-18 NOTE — NURSING NOTE
Care Completed  Pt stable for discharge  Report called to receiving facility  All questions answered  IV removed  Vaccines are up to date   Patient Discharged to [facility] and [address] via stretcher in the care of [facility] transport team

## 2020-07-18 NOTE — ASSESSMENT & PLAN NOTE
Questionable aspiration during dental procedure  Gastric distension noted on CT scan on admission  Received 6 days total of IV cefepime/Flagyl + Zosyn  Procalcitonin negative x3  Sputum culture no growth  Patient satting high 90s on O2 2 L  Low-grade fever 99 8 past 24 hours  Repeat chest x-ray today improving  Discussed with attending, discharge patient to rehab today  Recommend repeat chest x-ray in 1 week  Incentive spirometer

## 2020-07-18 NOTE — SOCIAL WORK
LOS - 8 days    SW following to assist with DCP  STR placement is planned  Pt has been accepted by [address] [facility] [address]  Spoke with Tiffanie Schneider, admission  at facility, and he confirmed private room availability for pt today  Notified CRNP of plan and availability  Discharge being considered for today  SW also spoke with pt and Zachery Costello about plan  SW discussed acceptance and availability at [facility]  All other referrals made were not accepted  Shelia chose to have pt transferred to [facility] when discharged  VERA and Shelia (over phone) discussed plan with pt as well  Discussed transportation options with aZchery Costello  Ms Lurdes Ruby chose to have transportation arranged for pt due to weakness  Group home staff planning to meet pt at facility after she arrives  SW will follow to assist with transfer when ordered

## 2020-07-18 NOTE — SOCIAL WORK
Discharge ordered  Pt will be transferred to [facility] [place], [place] for skilled rehab  [facility] contacted to arrange stretcher transport  [place] scheduled to transport around 4:00 pm   RENATO Gonzales), [facility] and  Michelle Schaefer) aware of plan

## 2020-07-18 NOTE — DISCHARGE SUMMARY
Discharge- Karla Hof 1960, 64 y o  female MRN: [id number]    Unit/Bed#: [address] [place] [address] Encounter: [id number]    Primary Care Provider: No primary care provider on file  Date and time admitted to hospital: 7/10/2020 11:18 AM        * Aspiration pneumonia [facility])  Assessment & Plan  Questionable aspiration during dental procedure  Gastric distension noted on CT scan on admission  Received 6 days total of IV cefepime/Flagyl + Zosyn  Procalcitonin negative x3  Sputum culture no growth  Patient satting high 90s on O2 2 L  Low-grade fever 99 8 past 24 hours  Repeat chest x-ray today improving  Discussed with attending, discharge patient to rehab today  Recommend repeat chest x-ray in 1 week  Incentive spirometer  Acute on chronic respiratory failure with hypercapnia (HCC)  Assessment & Plan  Resolved  Patient intubated prior to dental cleaning  Extubated postprocedure but had increased work of breathing and therefore intubated  Likely secondary to history of pulmonary fibrosis with possible aspiration pneumonia/pneumonitis  Patient uses oxygen 1 L via nasal cannula at baseline  ABG on arrival showed pH 7 338, pCO2 64  Likely component of chronic hypercapnia  Received 6 days of antibiotic total   Extubated on 7/15  On oxygen 2 L via nasal cannula currently, satting high 90s  Decreased to 1 L via NC  Change DuoNeb t i d  To albuterol neb t i d and q 4 hours p r n  On discharge  Resume Incruse Ellipta on discharge  Bronchospasm  Assessment & Plan  Questionable bronchospasm while on ventilator in ICU  Suspect secondary to IV Zosyn  Patient with significant tachycardia and wheezing following Zosyn administration, was changed to cefepime/Flagyl in ICU  Received total 5 days of IV steroids in ICU  No wheezing on auscultation today  Continue budesonide nebs b i d      Hypertension  Assessment & Plan  BP stable  Resumed Norvasc, HCTZ, losartan      Interstitial lung disease [facility])  Assessment & Plan  History of pulmonary fibrosis  Resumed Pulmicort daily  Resumed Advair(substitute with Breo)  Disease of thyroid gland  Assessment & Plan  Continue Synthroid    Hyperlipidemia  Assessment & Plan  Continue Lipitor    Schizo affective schizophrenia [facility])  Assessment & Plan  Continue Risperdal and Depakote    Intellectual disability  Assessment & Plan  Patient is from group home  Discharging Physician / Practitioner: MEME Knight  PCP: No primary care provider on file  Admission Date: 7/10/2020  Discharge Date: 07/18/20    Reason for Admission: Post-op Problem (Patient was at [facility] for a dental procedure  She was intubated  She is brought in here after a falied extubation  Patient arrives with Dr Florentino Pemberton Anethesiologist  Patient was re-intubated with no medication but given 20mg of Rocuronium and Midazolam 2mg at 11am    )        Resolved Problems  Date Reviewed: 7/18/2020          Resolved    Chronic obstructive pulmonary disease with acute exacerbation ([facility] [address] ) 7/14/2020     Resolved by  MEME Sr    Overview Signed 7/10/2020  3:50 PM by Tammy Cárdenas, DO     uses O2 at 1L every 24 hrs           Hematuria 7/13/2020     Resolved by  Vera Chun PA-C    HORTENCIA (acute kidney injury) ([facility] ) 7/14/2020     Resolved by  Zeferino Laird, [facility] Stay:  IP CONSULT TO CASE MANAGEMENT  IP CONSULT TO NUTRITION SERVICES    Procedures Performed:     · None    Significant Findings / Test Results:     · As below  Results from last 7 days   Lab Units 07/16/20  0507 07/14/20  0540 07/13/20  0451   WBC Thousand/uL 6 60 6 44 7 40   HEMOGLOBIN g/dL 10 2* 9 0* 9 0*   PLATELETS Thousands/uL 232 189 162     Results from last 7 days   Lab Units 07/18/20  0616 07/16/20  0507 07/15/20  0614   SODIUM mmol/L 137 139 137   POTASSIUM mmol/L 3 9 4 5 4 6   CHLORIDE mmol/L 100 104 101   CO2 mmol/L 34* 32 34*   BUN mg/dL 10 23 22   CREATININE mg/dL 0 79 0 83 0 87   CALCIUM mg/dL 8 8 8 7 8 7             No results found for: HGBA1C  Results from last 7 days   Lab Units 07/16/20  1620 07/16/20  1159 07/16/20  0511 07/15/20  2356 07/15/20  1722 07/15/20  1204 07/15/20  0607 07/14/20  2327 07/14/20  1733 07/14/20  1151 07/13/20  2342 07/13/20  1752   POC GLUCOSE mg/dl 74 83 84 86 101 159* 138 127 134 148* 141* 103     Results from last 7 days   Lab Units 07/15/20  0614 07/13/20  0451   PROCALCITONIN ng/ml <0 05 <0 05     Blood Culture: No results found for: BLOODCX  Urine Culture:   Lab Results   Component Value Date    URINECX No Growth <1000 cfu/mL 07/11/2020     Sputum Culture: No components found for: SPUTUMCX  Wound Culture: No results found for: WOUNDCULT     XR chest portable   Final Result by Satya Wyman MD (07/18 1503)      Left upper lobe pneumonia  Small right pleural effusion with basilar atelectasis  Workstation performed: [id number]         XR chest portable   Final Result by Satya Wyman MD (07/14 2601)      ET tube terminates 3 4 cm above toribio  Lines and tubes otherwise stable  Stable patchy pneumonia and trace right pleural effusion  Workstation performed: [id number]         XR chest portable   Final Result by Woodward Dancer, MD (07/13 7302)      ET tube 5 cm above the toribio  Patchy bilateral pneumonia, better shown on CT  Workstation performed: [id number]         XR chest portable   Final Result by Rosalia Wheeler DO (07/12 9915)      Stable bilateral opacities suggestive of pneumonia  Small effusions  Workstation performed: [id number]         XR chest portable   Final Result by Woodward Dancer, MD (07/12 6619)      Persistent bilateral pneumonia  Trace right effusion  Workstation performed: [id number]         CT chest without contrast   Final Result by Woodward Dancer, MD (07/10 1319)      No swallowed or aspirated foreign body    The 4 mm linear foreign body projecting over the superior mediastinum on the chest radiograph is a capped needle within the bedding posterior to the patient  Lungs suboptimally evaluated due to respiratory motion with honeycombing at the periphery of the left lung due to pulmonary fibrosis  Left greater than right upper lobe consolidation, dependent in the left upper lobe  The dependent location suggest aspiration  Small pleural effusions  Gastric distention  Workstation performed: [id number]         XR chest 1 view portable   ED Interpretation by Pradip Walker DO (07/10 1206)   ETT at toribio  Will pull back 3cm      Final Result by Dorita De La Paz MD (07/10 1247)      Low endotracheal tube  4 4 cm linear metallic opacity over the superior mediastinum  Examination of the patient is needed to make sure this is outside the body, either on the anterior or posterior chest wall  If there is no metallic object outside the body, consider CT to    evaluate for swallowed versus aspirated foreign body  Ground glass opacity throughout the left lung, question aspiration  Trace right effusion  I personally discussed this study with Lou Mason on 7/10/2020 at 12: 2 7 PM                Workstation performed: [id number]                Incidental Findings:   · None     Test Results Pending at Discharge (will require follow up): · None     Outpatient Tests Requested:  · BMP, Mag, CBC with diff in 3 days    Complications:  None    Reason for Admission:   Chief Complaint   Patient presents with    Post-op Problem     Patient was at [facility] outpatient for a dental procedure  She was intubated  She is brought in here after a falied extubation    Patient arrives with Dr Agnieszka Hillman Anethesiologist  Patient was re-intubated with no medication but given 20mg of Rocuronium and Midazolam 2mg at 11am           Hospital Course:     Per HPI: Armando Styles is a 64 y o  female patient with a PMH of intellectual disability, schizoaffective schizophrenia, hypertension, hyperlipidemia, hypothyroid, interstitial lung disease who originally presented to the hospital on 7/10/2020 due to acute respiratory failure due to aspiration pneumonia secondary to dental procedure outpatient  Patient failed extubation post dental procedure as outpatient and was admitted to ICU for acute respiratory failure with hypercapnia  Patient received IV antibiotic for aspiration pneumonia  Extubated on 7/15, satting well on oxygen 2 L post extubation  Decreased O2 to 1 L via nasal cannula today, patient satting well  Patient uses oxygen 1 L chronic at home due to interstitial lung disease  Repeat chest x-ray today improving  Continue incentive spirometer on discharge  Repeat chest x-ray in 1 week  Continue albuterol nebulizer t i d  On discharge  Patient is on pureed diet and thin liquids currently  Will require speech eval on Monday prior to advance diet  Patient was on regular diet at group home  Noted drooling from right side of mouth past 2 days  No other focal neuro deficit  Suspect secondary to dental procedure(one tooth extraction)  Continue to monitor  Spoke to staff from group home,all  questions answered  Hospital Course:    Please see above list of diagnoses and related plan for additional information  Condition at Discharge: fair       Discharge Day Visit / Exam:     Subjective:  Patient reports cough  Denies SOB, chest pain, headaches, dizziness, nausea, vomiting  Reports mild abdomen discomfort       Vitals: Blood Pressure: 138/68 (07/18/20 1050)  Pulse: 80 (07/18/20 0958)  Temperature: 99 1 °F (37 3 °C) (07/18/20 0958)  Temp Source: Oral (07/18/20 0958)  Respirations: 18 (07/18/20 0958)  Height: 5' (152 4 cm) (07/16/20 0500)  Weight - Scale: 60 5 kg (133 lb 6 1 oz) (07/18/20 0600)  SpO2: 98 % (07/18/20 0958)  Exam:   Physical Exam   Constitutional: She appears well-developed and well-nourished  HENT:   Head: Normocephalic and atraumatic  Neck: Normal range of motion  Neck supple  No JVD present  No tracheal deviation present  No thyromegaly present  Cardiovascular: Normal rate and regular rhythm  No murmur heard  Pulmonary/Chest: Effort normal  No respiratory distress  She has no wheezes  She has no rales  Diminished breath sounds right lower lobe,on O2 2 L, satting high 90s  Abdominal: Soft  Bowel sounds are normal  She exhibits no distension  There is no tenderness  There is no guarding  Musculoskeletal: Normal range of motion  She exhibits no edema, tenderness or deformity  Neurological: She is alert  Oriented to place and person, follows commands, moves all extremities  Right sided drooling, likely from dental procedure  Skin: Skin is warm and dry  Psychiatric: She has a normal mood and affect  Judgment normal    Nursing note and vitals reviewed  Discharge instructions/Information to patient and family:   See after visit summary for information provided to patient and family  Provisions for Follow-Up Care:  See after visit summary for information related to follow-up care and any pertinent home health orders  Disposition:     [facility] at [address] [place] [address] and [facility]    Planned Readmission:  None     Discharge Statement:  I spent 40 minutes discharging the patient  This time was spent on the day of discharge  I had direct contact with the patient on the day of discharge  Greater than 50% of the total time was spent examining patient, answering all patient questions, arranging and discussing plan of care with patient as well as directly providing post-discharge instructions  Additional time then spent on discharge activities  Discharge Medications:  See after visit summary for reconciled discharge medications provided to patient and family        ** Please Note: This note has been constructed using a voice recognition system **

## 2020-07-24 VITALS — OXYGEN SATURATION: 88 % | HEART RATE: 64 BPM | TEMPERATURE: 97.9 F
